# Patient Record
Sex: FEMALE | Race: WHITE | Employment: FULL TIME | URBAN - METROPOLITAN AREA
[De-identification: names, ages, dates, MRNs, and addresses within clinical notes are randomized per-mention and may not be internally consistent; named-entity substitution may affect disease eponyms.]

---

## 2020-07-23 DIAGNOSIS — Z01.818 PREOP EXAMINATION: ICD-10-CM

## 2020-07-23 PROCEDURE — U0003 INFECTIOUS AGENT DETECTION BY NUCLEIC ACID (DNA OR RNA); SEVERE ACUTE RESPIRATORY SYNDROME CORONAVIRUS 2 (SARS-COV-2) (CORONAVIRUS DISEASE [COVID-19]), AMPLIFIED PROBE TECHNIQUE, MAKING USE OF HIGH THROUGHPUT TECHNOLOGIES AS DESCRIBED BY CMS-2020-01-R: HCPCS

## 2020-07-23 RX ORDER — AMLODIPINE BESYLATE 5 MG/1
5 TABLET ORAL DAILY
COMMUNITY

## 2020-07-23 RX ORDER — MULTIVIT WITH MINERALS/LUTEIN
250 TABLET ORAL DAILY
COMMUNITY

## 2020-07-23 RX ORDER — INSULIN GLARGINE 100 [IU]/ML
18 INJECTION, SOLUTION SUBCUTANEOUS
COMMUNITY

## 2020-07-23 RX ORDER — BENAZEPRIL HYDROCHLORIDE 10 MG/1
10 TABLET ORAL DAILY
COMMUNITY
End: 2020-07-23

## 2020-07-23 RX ORDER — CELECOXIB 100 MG/1
100 CAPSULE ORAL DAILY
COMMUNITY

## 2020-07-23 RX ORDER — LOSARTAN POTASSIUM 25 MG/1
25 TABLET ORAL DAILY
COMMUNITY

## 2020-07-23 RX ORDER — MULTIVITAMIN
1 TABLET ORAL DAILY
COMMUNITY

## 2020-07-23 NOTE — PRE-PROCEDURE INSTRUCTIONS
Pre-Surgery Instructions:   Medication Instructions    amLODIPine (NORVASC) 5 mg tablet Instructed patient per Anesthesia Guidelines   APPLE CIDER VINEGAR PO Instructed patient per Anesthesia Guidelines   ascorbic acid (VITAMIN C) 250 mg tablet Instructed patient per Anesthesia Guidelines   ASPIRIN 81 PO Instructed patient per Anesthesia Guidelines   celecoxib (CeleBREX) 100 mg capsule Instructed patient per Anesthesia Guidelines   insulin glargine (LANTUS) 100 units/mL subcutaneous injection Instructed patient per Anesthesia Guidelines   losartan (COZAAR) 25 mg tablet Instructed patient per Anesthesia Guidelines   metFORMIN (GLUCOPHAGE) 500 mg tablet Instructed patient per Anesthesia Guidelines   Multiple Vitamin (MULTIVITAMIN) tablet Instructed patient per Anesthesia Guidelines   sitaGLIPtin (JANUVIA) 50 mg tablet Instructed patient per Anesthesia Guidelines  Instructed to take Amlodipine the morning of surgery with a sip of water  Instructed to take half of her usual Glargine insulin the evening before surgery ( take 9 units)  No aspirin, NSAIDs, vitamins, or supplements 1 week before surgery

## 2020-07-25 LAB — SARS-COV-2 RNA SPEC QL NAA+PROBE: NOT DETECTED

## 2020-07-29 ENCOUNTER — ANESTHESIA EVENT (OUTPATIENT)
Dept: PERIOP | Facility: HOSPITAL | Age: 60
End: 2020-07-29
Payer: COMMERCIAL

## 2020-07-30 ENCOUNTER — HOSPITAL ENCOUNTER (OUTPATIENT)
Facility: HOSPITAL | Age: 60
Setting detail: OUTPATIENT SURGERY
Discharge: HOME | End: 2020-07-31
Attending: PODIATRIST | Admitting: PODIATRIST
Payer: COMMERCIAL

## 2020-07-30 ENCOUNTER — HOSPITAL ENCOUNTER (OUTPATIENT)
Dept: RADIOLOGY | Facility: HOSPITAL | Age: 60
Setting detail: OUTPATIENT SURGERY
Discharge: HOME | End: 2020-07-30
Payer: COMMERCIAL

## 2020-07-30 ENCOUNTER — ANESTHESIA (OUTPATIENT)
Dept: PERIOP | Facility: HOSPITAL | Age: 60
End: 2020-07-30
Payer: COMMERCIAL

## 2020-07-30 ENCOUNTER — APPOINTMENT (OUTPATIENT)
Dept: RADIOLOGY | Facility: HOSPITAL | Age: 60
End: 2020-07-30
Payer: COMMERCIAL

## 2020-07-30 DIAGNOSIS — M14.672 CHARCOT ANKLE, LEFT: ICD-10-CM

## 2020-07-30 DIAGNOSIS — Z98.890 STATUS POST SURGERY: Primary | ICD-10-CM

## 2020-07-30 LAB
GLUCOSE SERPL-MCNC: 120 MG/DL (ref 65–140)
GLUCOSE SERPL-MCNC: 131 MG/DL (ref 65–140)
GLUCOSE SERPL-MCNC: 90 MG/DL (ref 65–140)
PLATELET # BLD AUTO: 215 THOUSANDS/UL (ref 149–390)
PMV BLD AUTO: 9.9 FL (ref 8.9–12.7)

## 2020-07-30 PROCEDURE — 82948 REAGENT STRIP/BLOOD GLUCOSE: CPT

## 2020-07-30 PROCEDURE — C1762 CONN TISS, HUMAN(INC FASCIA): HCPCS | Performed by: PODIATRIST

## 2020-07-30 PROCEDURE — C1713 ANCHOR/SCREW BN/BN,TIS/BN: HCPCS | Performed by: PODIATRIST

## 2020-07-30 PROCEDURE — 73610 X-RAY EXAM OF ANKLE: CPT

## 2020-07-30 PROCEDURE — 73630 X-RAY EXAM OF FOOT: CPT

## 2020-07-30 PROCEDURE — 85049 AUTOMATED PLATELET COUNT: CPT | Performed by: STUDENT IN AN ORGANIZED HEALTH CARE EDUCATION/TRAINING PROGRAM

## 2020-07-30 PROCEDURE — 73600 X-RAY EXAM OF ANKLE: CPT

## 2020-07-30 DEVICE — JOINT UNIVERSAL HINGED
Type: IMPLANTABLE DEVICE | Site: FOOT | Status: NON-FUNCTIONAL
Removed: 2020-09-23

## 2020-07-30 DEVICE — IMPLANTABLE DEVICE
Type: IMPLANTABLE DEVICE | Site: FOOT | Status: NON-FUNCTIONAL
Removed: 2020-09-23

## 2020-07-30 DEVICE — WIRE OLIVE 2MM
Type: IMPLANTABLE DEVICE | Site: FOOT | Status: NON-FUNCTIONAL
Removed: 2020-09-23

## 2020-07-30 DEVICE — BOLT WITH HEX 16MM
Type: IMPLANTABLE DEVICE | Site: FOOT | Status: NON-FUNCTIONAL
Removed: 2020-09-23

## 2020-07-30 DEVICE — POST 1 HOLE MALE
Type: IMPLANTABLE DEVICE | Site: FOOT | Status: NON-FUNCTIONAL
Removed: 2020-09-23

## 2020-07-30 DEVICE — POST 2 HOLE MALE
Type: IMPLANTABLE DEVICE | Site: FOOT | Status: NON-FUNCTIONAL
Removed: 2020-09-23

## 2020-07-30 DEVICE — (16 SQ CM) -ALLOGRAFT TISSUE WRAP DS WET 4 X 4 CM: Type: IMPLANTABLE DEVICE | Site: FOOT | Status: FUNCTIONAL

## 2020-07-30 DEVICE — RING OVAL 160MM FULL
Type: IMPLANTABLE DEVICE | Site: FOOT | Status: NON-FUNCTIONAL
Removed: 2020-09-23

## 2020-07-30 DEVICE — GRAFT PROLAYER 4 X 4CM 0.4-1MMTHCK: Type: IMPLANTABLE DEVICE | Site: FOOT | Status: FUNCTIONAL

## 2020-07-30 DEVICE — CLAMP ANGULAR CORRECTION
Type: IMPLANTABLE DEVICE | Site: FOOT | Status: NON-FUNCTIONAL
Removed: 2020-09-23

## 2020-07-30 DEVICE — BOLT W/ HEX 20MM
Type: IMPLANTABLE DEVICE | Site: FOOT | Status: NON-FUNCTIONAL
Removed: 2020-09-23

## 2020-07-30 DEVICE — BOLT UNIVERSALWIRE FIXATION
Type: IMPLANTABLE DEVICE | Site: FOOT | Status: NON-FUNCTIONAL
Removed: 2020-09-23

## 2020-07-30 DEVICE — POST 3 HOLE MALE
Type: IMPLANTABLE DEVICE | Site: FOOT | Status: NON-FUNCTIONAL
Removed: 2020-09-23

## 2020-07-30 DEVICE — WIRE BAYONET 2MM
Type: IMPLANTABLE DEVICE | Site: FOOT | Status: NON-FUNCTIONAL
Removed: 2020-09-23

## 2020-07-30 DEVICE — NUT 10MM
Type: IMPLANTABLE DEVICE | Site: FOOT | Status: NON-FUNCTIONAL
Removed: 2020-09-23

## 2020-07-30 RX ORDER — HYDROMORPHONE HCL/PF 1 MG/ML
SYRINGE (ML) INJECTION AS NEEDED
Status: DISCONTINUED | OUTPATIENT
Start: 2020-07-30 | End: 2020-07-30 | Stop reason: SURG

## 2020-07-30 RX ORDER — SODIUM CHLORIDE 9 MG/ML
125 INJECTION, SOLUTION INTRAVENOUS CONTINUOUS
Status: DISCONTINUED | OUTPATIENT
Start: 2020-07-30 | End: 2020-07-31 | Stop reason: HOSPADM

## 2020-07-30 RX ORDER — ACETAMINOPHEN 325 MG/1
650 TABLET ORAL EVERY 6 HOURS PRN
Status: DISCONTINUED | OUTPATIENT
Start: 2020-07-30 | End: 2020-07-30

## 2020-07-30 RX ORDER — ASPIRIN 81 MG/1
81 TABLET, CHEWABLE ORAL DAILY
Status: DISCONTINUED | OUTPATIENT
Start: 2020-07-30 | End: 2020-07-31 | Stop reason: HOSPADM

## 2020-07-30 RX ORDER — AMLODIPINE BESYLATE 5 MG/1
5 TABLET ORAL DAILY
Status: DISCONTINUED | OUTPATIENT
Start: 2020-07-30 | End: 2020-07-31 | Stop reason: HOSPADM

## 2020-07-30 RX ORDER — LOSARTAN POTASSIUM 25 MG/1
25 TABLET ORAL DAILY
Status: DISCONTINUED | OUTPATIENT
Start: 2020-07-30 | End: 2020-07-31 | Stop reason: HOSPADM

## 2020-07-30 RX ORDER — MAGNESIUM HYDROXIDE 1200 MG/15ML
LIQUID ORAL AS NEEDED
Status: DISCONTINUED | OUTPATIENT
Start: 2020-07-30 | End: 2020-07-30 | Stop reason: HOSPADM

## 2020-07-30 RX ORDER — OXYCODONE HYDROCHLORIDE AND ACETAMINOPHEN 5; 325 MG/1; MG/1
1 TABLET ORAL EVERY 4 HOURS PRN
Status: DISCONTINUED | OUTPATIENT
Start: 2020-07-30 | End: 2020-07-30

## 2020-07-30 RX ORDER — MIDAZOLAM HYDROCHLORIDE 2 MG/2ML
INJECTION, SOLUTION INTRAMUSCULAR; INTRAVENOUS AS NEEDED
Status: DISCONTINUED | OUTPATIENT
Start: 2020-07-30 | End: 2020-07-30 | Stop reason: SURG

## 2020-07-30 RX ORDER — OXYCODONE HYDROCHLORIDE 5 MG/1
5 TABLET ORAL EVERY 4 HOURS PRN
Status: DISCONTINUED | OUTPATIENT
Start: 2020-07-30 | End: 2020-07-31 | Stop reason: HOSPADM

## 2020-07-30 RX ORDER — FENTANYL CITRATE/PF 50 MCG/ML
25 SYRINGE (ML) INJECTION
Status: DISCONTINUED | OUTPATIENT
Start: 2020-07-30 | End: 2020-07-30 | Stop reason: HOSPADM

## 2020-07-30 RX ORDER — MULTIVIT WITH MINERALS/LUTEIN
250 TABLET ORAL DAILY
Status: DISCONTINUED | OUTPATIENT
Start: 2020-07-30 | End: 2020-07-31 | Stop reason: HOSPADM

## 2020-07-30 RX ORDER — OXYCODONE HYDROCHLORIDE 5 MG/1
10 TABLET ORAL EVERY 4 HOURS PRN
Status: DISCONTINUED | OUTPATIENT
Start: 2020-07-30 | End: 2020-07-31 | Stop reason: HOSPADM

## 2020-07-30 RX ORDER — ALBUTEROL SULFATE 2.5 MG/3ML
2.5 SOLUTION RESPIRATORY (INHALATION) ONCE AS NEEDED
Status: DISCONTINUED | OUTPATIENT
Start: 2020-07-30 | End: 2020-07-30 | Stop reason: HOSPADM

## 2020-07-30 RX ORDER — ACETAMINOPHEN 325 MG/1
975 TABLET ORAL 3 TIMES DAILY
Status: DISCONTINUED | OUTPATIENT
Start: 2020-07-30 | End: 2020-07-31 | Stop reason: HOSPADM

## 2020-07-30 RX ORDER — EPHEDRINE SULFATE 50 MG/ML
INJECTION INTRAVENOUS AS NEEDED
Status: DISCONTINUED | OUTPATIENT
Start: 2020-07-30 | End: 2020-07-30 | Stop reason: SURG

## 2020-07-30 RX ORDER — FENTANYL CITRATE 50 UG/ML
INJECTION, SOLUTION INTRAMUSCULAR; INTRAVENOUS AS NEEDED
Status: DISCONTINUED | OUTPATIENT
Start: 2020-07-30 | End: 2020-07-30 | Stop reason: SURG

## 2020-07-30 RX ORDER — CELECOXIB 100 MG/1
100 CAPSULE ORAL DAILY
Status: DISCONTINUED | OUTPATIENT
Start: 2020-07-30 | End: 2020-07-31 | Stop reason: HOSPADM

## 2020-07-30 RX ORDER — PROPOFOL 10 MG/ML
INJECTION, EMULSION INTRAVENOUS AS NEEDED
Status: DISCONTINUED | OUTPATIENT
Start: 2020-07-30 | End: 2020-07-30 | Stop reason: SURG

## 2020-07-30 RX ORDER — CEFAZOLIN SODIUM 2 G/50ML
2000 SOLUTION INTRAVENOUS ONCE
Status: COMPLETED | OUTPATIENT
Start: 2020-07-30 | End: 2020-07-30

## 2020-07-30 RX ORDER — INSULIN GLARGINE 100 [IU]/ML
18 INJECTION, SOLUTION SUBCUTANEOUS
Status: DISCONTINUED | OUTPATIENT
Start: 2020-07-30 | End: 2020-07-31 | Stop reason: HOSPADM

## 2020-07-30 RX ORDER — ONDANSETRON 2 MG/ML
INJECTION INTRAMUSCULAR; INTRAVENOUS AS NEEDED
Status: DISCONTINUED | OUTPATIENT
Start: 2020-07-30 | End: 2020-07-30 | Stop reason: SURG

## 2020-07-30 RX ADMIN — EPHEDRINE SULFATE 7.5 MG: 50 INJECTION, SOLUTION INTRAVENOUS at 12:42

## 2020-07-30 RX ADMIN — CELECOXIB 100 MG: 100 CAPSULE ORAL at 16:42

## 2020-07-30 RX ADMIN — MIDAZOLAM 2 MG: 1 INJECTION INTRAMUSCULAR; INTRAVENOUS at 12:06

## 2020-07-30 RX ADMIN — ONDANSETRON 4 MG: 2 INJECTION INTRAMUSCULAR; INTRAVENOUS at 14:41

## 2020-07-30 RX ADMIN — INSULIN GLARGINE 18 UNITS: 100 INJECTION, SOLUTION SUBCUTANEOUS at 22:28

## 2020-07-30 RX ADMIN — SODIUM CHLORIDE: 0.9 INJECTION, SOLUTION INTRAVENOUS at 14:11

## 2020-07-30 RX ADMIN — SODIUM CHLORIDE 125 ML/HR: 0.9 INJECTION, SOLUTION INTRAVENOUS at 20:43

## 2020-07-30 RX ADMIN — CEFAZOLIN SODIUM 2000 MG: 2 SOLUTION INTRAVENOUS at 11:50

## 2020-07-30 RX ADMIN — FENTANYL CITRATE 25 MCG: 50 INJECTION, SOLUTION INTRAMUSCULAR; INTRAVENOUS at 12:21

## 2020-07-30 RX ADMIN — ONDANSETRON 4 MG: 2 INJECTION INTRAMUSCULAR; INTRAVENOUS at 12:22

## 2020-07-30 RX ADMIN — FENTANYL CITRATE 25 MCG: 50 INJECTION, SOLUTION INTRAMUSCULAR; INTRAVENOUS at 13:07

## 2020-07-30 RX ADMIN — PROPOFOL 200 MG: 10 INJECTION, EMULSION INTRAVENOUS at 12:14

## 2020-07-30 RX ADMIN — ACETAMINOPHEN 975 MG: 325 TABLET ORAL at 20:45

## 2020-07-30 RX ADMIN — FENTANYL CITRATE 25 MCG: 50 INJECTION, SOLUTION INTRAMUSCULAR; INTRAVENOUS at 12:14

## 2020-07-30 RX ADMIN — ASPIRIN 81 MG 81 MG: 81 TABLET ORAL at 16:14

## 2020-07-30 RX ADMIN — SODIUM CHLORIDE 125 ML/HR: 0.9 INJECTION, SOLUTION INTRAVENOUS at 16:09

## 2020-07-30 RX ADMIN — ENOXAPARIN SODIUM 40 MG: 40 INJECTION SUBCUTANEOUS at 17:39

## 2020-07-30 RX ADMIN — FENTANYL CITRATE 25 MCG: 50 INJECTION, SOLUTION INTRAMUSCULAR; INTRAVENOUS at 13:11

## 2020-07-30 RX ADMIN — LOSARTAN POTASSIUM 25 MG: 25 TABLET, FILM COATED ORAL at 16:14

## 2020-07-30 RX ADMIN — Medication 250 MG: at 16:43

## 2020-07-30 RX ADMIN — HYDROMORPHONE HYDROCHLORIDE 0.5 MG: 1 INJECTION, SOLUTION INTRAMUSCULAR; INTRAVENOUS; SUBCUTANEOUS at 14:40

## 2020-07-30 RX ADMIN — SODIUM CHLORIDE 125 ML/HR: 0.9 INJECTION, SOLUTION INTRAVENOUS at 11:20

## 2020-07-30 RX ADMIN — LIDOCAINE HYDROCHLORIDE 60 MG: 20 INJECTION, SOLUTION INTRAVENOUS at 12:14

## 2020-07-30 RX ADMIN — ACETAMINOPHEN 975 MG: 325 TABLET ORAL at 16:14

## 2020-07-30 NOTE — OP NOTE
OPERATIVE REPORT - Podiatry  PATIENT NAME: Kami Hager    :  1960  MRN: [id number]  Pt Location: [facility] OR ROOM 02    SURGERY DATE: 2020    Surgeon(s) and Role: * Zahida Mccarty DPM - Primary     * Mandy Rivas DPM - Assisting    Pre-op Diagnosis:  Non-pressure chronic ulcer of left heel and midfoot with fat layer exposed ([address] ) [L97 422]  Non-pressure chronic ulcer of other part of right foot with unspecified severity ([address] ) [L97 519]  Charcot ankle, left [M14 672]    Post-Op Diagnosis Codes:     * Non-pressure chronic ulcer of left heel and midfoot with fat layer exposed ([address] ) [L97 422]     * Non-pressure chronic ulcer of other part of right foot with unspecified severity ([address] ) [L97 519]     * Charcot ankle, left [M14 672]    Procedure(s) (LRB):  ENDO GASTROC RECESSION (Left)  EX-FIX APPLICATION (Left)  DEBRIDE ULCER W/GRAFT APPS (Bilateral)  BONE STIM APPLICATION & activation (Left)    Specimen(s):  * No specimens in log *    Estimated Blood Loss:   Minimal    Drains:  * No LDAs found *    Anesthesia Type:   General     Hemostasis:  Pneumatic thigh tourniquet at 300 mmHg for 44 minutes    Materials:  Implant Name Type Inv   Item Serial No   Lot No  LRB No  Used Action   [id number]  (16 SQ CM) -ALLOGRAFT TISSUE WRAP DS WET 4 X 4 CM  [employer] [id number] Left 1 Implanted   RING OVAL 160MM FULL - [id number]  RING OVAL 160MM FULL  [employer]  Left 3 Implanted   RING 3/4 DOUBLE HOLE 160MM - [id number]  RING 3/4 DOUBLE HOLE 160MM  [employer]  Left 1 Implanted   CLAMP ANGULAR CORRECTION - [id number]  CLAMP ANGULAR CORRECTION  [employer]  Left 6 Implanted   JOINT UNIVERSAL HINGED - [id number]  JOINT UNIVERSAL HINGED  [employer]  Left 6 Implanted   NUT 10MM - [id number]  NUT 10MM  [employer]  Left 26 Implanted   STRUT RIGID COMPRESSION 100MM - [id number]  STRUT RIGID COMPRESSION 100MM  [employer]  Left 4 Implanted   BOLT WITH HEX 16MM - [id number]  BOLT WITH HEX 16MM  [employer]  Left 14 Implanted   BOLT WITH HEX 20MM - [id number]  BOLT WITH HEX 20MM  [employer]  Left 6 Implanted   RAIL BODY COMPLETE XLNG - [id number]  RAIL BODY COMPLETE XLNG  [employer]  Left 6 Implanted   WIRE BAYONET 2MM - [id number]  WIRE BAYONET 2MM  [employer]  Left 4 Implanted   WIRE OLIVE 2MM - [id number]  WIRE OLIVE 2MM  [employer]  Left 2 Implanted   BOLT UNIVERSALWIRE FIXATION - [id number]  BOLT UNIVERSALWIRE FIXATION  [employer]  Left 12 Implanted   BOLT UNIVERSAL HALF PIN FIXATION - [id number]  BOLT UNIVERSAL HALF PIN FIXATION  [employer]  Left 3 Implanted   PIN HALF 5 X 180MM W/40MM THRD - [id number]  PIN HALF 5 X 180MM W/40MM THRD  [employer]  Left 2 Implanted   PIN HALF 4 X 180MM W/40MMTHRD - [id number]  PIN HALF 4 X 180MM W/40MMTHRD  [employer]  Left 2 Implanted   POST 1 HOLE MALE - [id number]  POST [address]  [facility]  Left 2 Implanted   POST 3 HOLE MALE - [id number]  POST 3 HOLE MALE  [employer]  Left 2 Implanted   POST 2 HOLE MALE - [id number]  POST 2 HOLE MALE  [employer]  Left 1 Implanted   [id number]  GRAFT PROLAYER 4 X 4CM 0 4-1MMTHCK  [employer] [id number] Left 1 Implanted     4-0 nylon    Operative Findings:  Consistent with diagnosis    Complications:   None    Procedure and Technique:     Under mild sedation, the patient was brought into the operating room and placed on the operating room table in the supine position  IV sedation was achieved by anesthesia team and a universal timeout was performed where all parties are in agreement of correct patient, correct procedure and correct site  A pneumatic tourniquet was then placed over the patient's left lower extremity with ample padding  The foot was then prepped and draped in the usual aseptic manner  An esmarch bandage was used to exsangunate the foot and the pneumatic tourniquet was then inflated to 300mmHg  Attention was directed to the medial aspect of distal 1/3 leg where a small 1cm stab linear incision was made using #15 blade   The incision was deepened down to subcutaneous tissue using hemostat  Next using a manufactures protocol an endoscopic gastroc recession was performed  Then the obturator and cannula as one full unit were inserted in the plane of tissue medially to laterally  At this location a 15 blade was used to make a stab incision for the lateral skin portal  The obturator cannula was passed through the lateral portal  The outrigger was removed  Several cotton swabs were passed through the cannula to remove any loose fatty debris  Then the camera was inserted into the lateral portal to observe the gastroc fascia  A blunt probe was used to help visualize the fascia  Then a triangular blade was used to release the fascia from a lateral to medial direction applying dorsiflexion force to the ankle  After doing so the underlying muscle belly was visualized  The camera was removed and the site was flushed with saline  The obturator was placed back into the cannula and it was removed in its one full unit  Incision sites were rinsed with copious amount of normal sterile saline  An allowrap was placed into the medial incision site  Medial and lateral incisions were reapproximated utilizing a 4 0 nylon  The pneumatic thigh tourniquet was then deflated and a prompt hyperemic response was noted to the all digits  Next attention was directed to the left leg and foot for application of SEAL external fixator for distraction of the foot  To begin with two smooth pins were placed into the proximal tibia in 2 smooth pins were placed into the distal tibia, a circular frame was then fixated for stabilization  Attention was then directed to the foot where 2 half pins were placed into calcaneus unicortical   Attention was then directed to the midfoot where 2 cross olive wire pins were placed one from 5th metatarsal base extending medially to the level of 1st metatarsal base and 2nd from 1st metatarsal base extending laterally to the 5th metatarsal base    At this point the remaining distal circular and 1/3 frames were secured to the half pins and olive wires  Distraction was achieved by tightening the struts medially and laterally  This was confirmed utilizing C-arm fluoroscopy  Next attention was directed to the left medial hallux wound  Using a #15 the wound was excisionally debrided to healthy bleeding subcutaneous tissue  Removed fibrotic nonviable tissue  Post debridement the wound measured 2 0x0 6x0 2cm  A dermal graft prolayer was applied to the wound and was secured using staples  Mineral oil was then applied on the graft this was covered with 4 x 4 gauze and Honorio  All the incision sites were then dressed with Xeroform, 4 x 4 gauze, Honorio and Ace wrap  Gonzales Hernandez was present for application of bone growth stimulator  Patient tolerated the procedure and anesthesia well and was transferred to PACU with stable vital signs  Dr Karl Avina was present during the entire procedure and participated in all key aspects  Lizzeth Burch DPM  DATE: July 30, 2020  TIME: 3:09 PM      Portions of the record may have been created with voice recognition software  Occasional wrong word or "sound a like" substitutions may have occurred due to the inherent limitations of voice recognition software  Read the chart carefully and recognize, using context, where substitutions have occurred

## 2020-07-30 NOTE — ANESTHESIA POSTPROCEDURE EVALUATION
Post-Op Assessment Note    CV Status:  Stable    Pain management: adequate     Mental Status:  Alert and awake   Hydration Status:  Euvolemic   PONV Controlled:  Controlled   Airway Patency:  Patent   Post Op Vitals Reviewed: Yes      Staff: Anesthesiologist           /69 (07/30/20 1512)    Temp      Pulse 84 (07/30/20 1512)   Resp 13 (07/30/20 1512)    SpO2 100 % (07/30/20 1512)

## 2020-07-30 NOTE — ANESTHESIA PREPROCEDURE EVALUATION
Review of Systems/Medical History  Patient summary reviewed  Chart reviewed  No history of anesthetic complications     Cardiovascular  Hypertension controlled,    Pulmonary  Negative pulmonary ROS        GI/Hepatic  Negative GI/hepatic ROS          Negative  ROS        Endo/Other  Diabetes well controlled type 2 Insulin,      GYN  Negative gynecology ROS          Hematology  Negative hematology ROS      Musculoskeletal  Negative musculoskeletal ROS   Comment: Charcot foot,fractures and ulcers      Neurology  Negative neurology ROS      Psychology   Negative psychology ROS              Physical Exam    Airway    Mallampati score: II  TM Distance: >3 FB  Neck ROM: full     Dental   No notable dental hx     Cardiovascular  Rhythm: regular, Rate: normal, Cardiovascular exam normal    Pulmonary  Pulmonary exam normal Breath sounds clear to auscultation,     Other Findings        Anesthesia Plan  ASA Score- 2     Anesthesia Type- general and regional with ASA Monitors  Additional Monitors:   Airway Plan:     Comment: Hx delayed emergence after 10 hour cancer surgery        Plan Factors-  Patient did not smoke on day of surgery  Induction- intravenous  Postoperative Plan-     Informed Consent- Anesthetic plan and risks discussed with patient and spouse

## 2020-07-30 NOTE — H&P
H&P Exam - Ewing Fret 64 y o  female MRN: [id number]    Unit/Bed#: OR [place] Encounter: [id number]    Assessment:  64 y/p diabetic female s/p endoscopic gastroc Recession, Application of Ex-fix, wound debridement with dermal graft application  1  Diabetes mellitus  2  Hypertension      Plan:  - patient will be admitted for 23 hour observation under Dr Beth Moseley service for postop pain control and physical therapy evaluation   -continue all home pharmacological agents, placed patient on insulin sliding scale  -patient started on Lovenox, she will be discharged with Lovenox for DVT prophylaxis  -nonweightbearing to the left lower extremity    Dispo:  attending pain control and PT evaluation    History of Present Illness   A 51-year-old female with past medical history of diabetes and hypertension now status post endoscopic gastroc recession, application of ex fix, wound debridement and dermal graft application secondary to Charcot and rocker bottom foot deformity  Patient is admitted for 23 hour observation  Patient denies any complaints  Review of Systems   Constitutional: Negative  HENT: Negative  Respiratory: Negative  Cardiovascular: Negative  Gastrointestinal: Negative  Musculoskeletal: Negative  Skin: Positive for wound  Neurological: Negative  Psychiatric/Behavioral: Negative          Historical Information   Past Medical History:   Diagnosis Date    Colon polyp     Diabetes mellitus [facility] )     Foot ulceration ([facility] )     bilat    Hypertension      Past Surgical History:   Procedure Laterality Date     SECTION      x 3    COLONOSCOPY      HYSTERECTOMY      JOINT REPLACEMENT Right      Social History   Social History     Substance and Sexual Activity   Alcohol Use Yes    Frequency: Monthly or less    Drinks per session: 1 or 2    Comment: rarely     Social History     Substance and Sexual Activity   Drug Use Never     Social History     Tobacco Use   Smoking Status Never Smoker   Smokeless Tobacco Never Used     E-Cigarette Use: Never User     E-Cigarette/Vaping Substances    Nicotine No     THC No     CBD No     Flavoring No     Other No     Unknown No        Family History: non-contributory    Meds/Allergies   all medications and allergies reviewed  No Known Allergies    Objective   First Vitals:   Blood Pressure: (!) 185/79 (07/30/20 1051)  Pulse: 93 (07/30/20 1051)  Temperature: 97 7 °F (36 5 °C) (07/30/20 1051)  Temp Source: Tympanic (07/30/20 1051)  Respirations: 16 (07/30/20 1051)  Height: 5' 2" (157 5 cm) (07/23/20 1034)  Weight - Scale: 69 9 kg (154 lb) (07/23/20 1034)  SpO2: 100 % (07/30/20 1051)    Current Vitals:   Blood Pressure: 149/69 (07/30/20 1512)  Pulse: 84 (07/30/20 1512)  Temperature: 97 9 °F (36 6 °C) (07/30/20 1457)  Temp Source: Tympanic (07/30/20 1051)  Respirations: 13 (07/30/20 1512)  Height: 5' 2" (157 5 cm) (07/30/20 1051)  Weight - Scale: 68 5 kg (151 lb) (07/30/20 1051)  SpO2: 100 % (07/30/20 1512)      Intake/Output Summary (Last 24 hours) at 7/30/2020 1529  Last data filed at 7/30/2020 1458  Gross per 24 hour   Intake 1300 ml   Output    Net 1300 ml       Invasive Devices     Peripheral Intravenous Line            Peripheral IV 07/30/20 Left Hand less than 1 day                Physical Exam   Constitutional: She is oriented to person, place, and time  She appears well-developed and well-nourished  Neck: Normal range of motion  Neck supple  Cardiovascular: Normal rate  Pulses:       Dorsalis pedis pulses are 1+ on the right side, and 1+ on the left side  Posterior tibial pulses are 1+ on the right side, and 1+ on the left side  Pulmonary/Chest: Effort normal    Abdominal: Soft  Musculoskeletal: Normal range of motion  She exhibits edema and deformity  Right foot: Normal         Left foot: There is tenderness  Feet:   Right Foot:   Protective Sensation: 10 sites tested  3 sites sensed     Skin Integrity: Positive for dry skin  Left Foot:   Protective Sensation: 10 sites tested  3 sites sensed  Skin Integrity: Positive for ulcer  Neurological: She is alert and oriented to person, place, and time  Skin: Skin is warm  Capillary refill takes less than 2 seconds  Left 1st hallux wound measuring 2 0x0 6x0 2cm with skin graft substitute applied          Lab Results:   Imaging:   EKG, Pathology, and Other Studies:     Code Status: No Order  Advance Directive and Living Will:      Power of :    POLST:

## 2020-07-30 NOTE — PLAN OF CARE
Problem: Potential for Falls  Goal: Patient will remain free of falls  Description  INTERVENTIONS:  - Assess patient frequently for physical needs  -  Identify cognitive and physical deficits and behaviors that affect risk of falls    -  [employer] fall precautions as indicated by assessment   - Educate patient/family on patient safety including physical limitations  - Instruct patient to call for assistance with activity based on assessment  - Modify environment to reduce risk of injury  - Consider OT/PT consult to assist with strengthening/mobility  Outcome: Progressing     Problem: PAIN - ADULT  Goal: Verbalizes/displays adequate comfort level or baseline comfort level  Description  Interventions:  - Encourage patient to monitor pain and request assistance  - Assess pain using appropriate pain scale  - Administer analgesics based on type and severity of pain and evaluate response  - Implement non-pharmacological measures as appropriate and evaluate response  - Consider cultural and social influences on pain and pain management  - Notify physician/advanced practitioner if interventions unsuccessful or patient reports new pain  Outcome: Progressing     Problem: INFECTION - ADULT  Goal: Absence or prevention of progression during hospitalization  Description  INTERVENTIONS:  - Assess and monitor for signs and symptoms of infection  - Monitor lab/diagnostic results  - Monitor all insertion sites, i e  indwelling lines, tubes, and drains  - Monitor endotracheal if appropriate and nasal secretions for changes in amount and color  - [employer] appropriate cooling/warming therapies per order  - Administer medications as ordered  - Instruct and encourage patient and family to use good hand hygiene technique  - Identify and instruct in appropriate isolation precautions for identified infection/condition  Outcome: Progressing     Problem: SAFETY ADULT  Goal: Maintain or return to baseline ADL function  Description  INTERVENTIONS:  -  Assess patient's ability to carry out ADLs; assess patient's baseline for ADL function and identify physical deficits which impact ability to perform ADLs (bathing, care of mouth/teeth, toileting, grooming, dressing, etc )  - Assess/evaluate cause of self-care deficits   - Assess range of motion  - Assess patient's mobility; develop plan if impaired  - Assess patient's need for assistive devices and provide as appropriate  - Encourage maximum independence but intervene and supervise when necessary  - Involve family in performance of ADLs  - Assess for home care needs following discharge   - Consider OT consult to assist with ADL evaluation and planning for discharge  - Provide patient education as appropriate  Outcome: Progressing  Goal: Maintain or return mobility status to optimal level  Description  INTERVENTIONS:  - Assess patient's baseline mobility status (ambulation, transfers, stairs, etc )    - Identify cognitive and physical deficits and behaviors that affect mobility  - Identify mobility aids required to assist with transfers and/or ambulation (gait belt, sit-to-stand, lift, walker, cane, etc )  - [place] fall precautions as indicated by assessment  - Record patient progress and toleration of activity level on Mobility SBAR; progress patient to next Phase/Stage  - Instruct patient to call for assistance with activity based on assessment  - Consider rehabilitation consult to assist with strengthening/weightbearing, etc   Outcome: Progressing     Problem: DISCHARGE PLANNING  Goal: Discharge to home or other facility with appropriate resources  Description  INTERVENTIONS:  - Identify barriers to discharge w/patient and caregiver  - Arrange for needed discharge resources and transportation as appropriate  - Identify discharge learning needs (meds, wound care, etc )  - Arrange for interpretive services to assist at discharge as needed  - Refer to Case Management Department for coordinating discharge planning if the patient needs post-hospital services based on physician/advanced practitioner order or complex needs related to functional status, cognitive ability, or social support system  Outcome: Progressing     Problem: Knowledge Deficit  Goal: Patient/family/caregiver demonstrates understanding of disease process, treatment plan, medications, and discharge instructions  Description  Complete learning assessment and assess knowledge base    Interventions:  - Provide teaching at level of understanding  - Provide teaching via preferred learning methods  Outcome: Progressing

## 2020-07-31 VITALS
OXYGEN SATURATION: 97 % | TEMPERATURE: 98.6 F | WEIGHT: 151 LBS | HEIGHT: 62 IN | DIASTOLIC BLOOD PRESSURE: 86 MMHG | HEART RATE: 82 BPM | BODY MASS INDEX: 27.79 KG/M2 | RESPIRATION RATE: 18 BRPM | SYSTOLIC BLOOD PRESSURE: 189 MMHG

## 2020-07-31 LAB
GLUCOSE SERPL-MCNC: 112 MG/DL (ref 65–140)
GLUCOSE SERPL-MCNC: 159 MG/DL (ref 65–140)
GLUCOSE SERPL-MCNC: 93 MG/DL (ref 65–140)

## 2020-07-31 PROCEDURE — 82948 REAGENT STRIP/BLOOD GLUCOSE: CPT

## 2020-07-31 PROCEDURE — 97163 PT EVAL HIGH COMPLEX 45 MIN: CPT

## 2020-07-31 PROCEDURE — 97530 THERAPEUTIC ACTIVITIES: CPT

## 2020-07-31 RX ORDER — OXYCODONE HYDROCHLORIDE AND ACETAMINOPHEN 5; 325 MG/1; MG/1
1 TABLET ORAL EVERY 4 HOURS PRN
Qty: 10 TABLET | Refills: 0 | Status: SHIPPED | OUTPATIENT
Start: 2020-07-31 | End: 2020-08-10

## 2020-07-31 RX ADMIN — LOSARTAN POTASSIUM 25 MG: 25 TABLET, FILM COATED ORAL at 08:34

## 2020-07-31 RX ADMIN — SODIUM CHLORIDE 125 ML/HR: 0.9 INJECTION, SOLUTION INTRAVENOUS at 04:24

## 2020-07-31 RX ADMIN — ACETAMINOPHEN 975 MG: 325 TABLET ORAL at 08:34

## 2020-07-31 RX ADMIN — INSULIN LISPRO 1 UNITS: 100 INJECTION, SOLUTION INTRAVENOUS; SUBCUTANEOUS at 11:50

## 2020-07-31 RX ADMIN — CELECOXIB 100 MG: 100 CAPSULE ORAL at 08:35

## 2020-07-31 RX ADMIN — Medication 250 MG: at 08:35

## 2020-07-31 RX ADMIN — ASPIRIN 81 MG 81 MG: 81 TABLET ORAL at 08:34

## 2020-07-31 RX ADMIN — ENOXAPARIN SODIUM 40 MG: 40 INJECTION SUBCUTANEOUS at 08:34

## 2020-07-31 RX ADMIN — AMLODIPINE BESYLATE 5 MG: 5 TABLET ORAL at 08:34

## 2020-07-31 NOTE — PLAN OF CARE
Problem: PHYSICAL THERAPY ADULT  Goal: Performs mobility at highest level of function for planned discharge setting  See evaluation for individualized goals  Description  Treatment/Interventions: Functional transfer training, LE strengthening/ROM, Elevations, Therapeutic exercise, Endurance training, Patient/family training, Equipment eval/education, Bed mobility, Gait training, Compensatory technique education, Continued evaluation, Spoke to nursing, Spoke to advanced practitioner, Spoke to case management, Family  Equipment Recommended: Other (Comment)(BSC)       See flowsheet documentation for full assessment, interventions and recommendations  Note:   Prognosis: Fair  Problem List: Decreased strength, Decreased endurance, Impaired balance, Decreased mobility, Impaired judgement, Obesity, Decreased skin integrity, Pain, Orthopedic restrictions  Assessment: Kami Hager is a 64 y o  female admitted to [facility] on 7/30/2020 for <principal problem not specified> s/p ENDO GASTROC RECESSION (Left) EX-FIX APPLICATION (Left), DEBRIDE ULCER W/GRAFT APPS (Bilateral), BONE STIM APPLICATION & activation (Left)  Pt  has a past medical history of Colon polyp, Diabetes mellitus ([facility] ), Foot ulceration ([facility] ), and Hypertension    PT was consulted and pt was seen on 7/31/2020 for mobility assessment and d/c planning  Pt presents NWB LLE w ex fix, high fall risk, PIV  Pt verbalize understanding of NWB, occ vc for safety during functional tasks to reinforce  Pt is currently functioning at a modified independent assistance level for bed mobility, supervision assistance x1 level for transfers, supervision assistance x1 level for ambulation with knee walker  Additional session spent stair training to determine appropriateness of second floor set up at home  Minimum assistance x1 for elevations via bumping up/down   Pt w increased difficulty transf from standing<>seated position due to generalized weakness, difficulty maintain NWB and poor balance/stability  Discussed multiple options for alternate methods of stair negotiation including crutches, use of chair to ease transitions, hop to method  Pt decline crutches due to fear of LOB  Also declining hop to method  Prefer to bump up/down at this time  Understands difficulties with sitting/standing transition impacting safety w steps at home  Agreeable to first floor set up at d/c  Declining HHPT at this time to further work on stair training at home, pt prefers to hold off until OP podiatry f/u appt  Pt will benefit from continued skilled IP PT to address the above mentioned impairments  in order to maximize recovery and increase functional independence when completing mobility and ADLs  Currently PT recommendations for DME include BSC  At this time PT recommendations for d/c are home w family support and first floor set up  Refusing STR, HHPT  CM and Podiatry updated  Barriers to Discharge: Inaccessible home environment  Barriers to Discharge Comments: would require first floor set up  PT Discharge Recommendation: [address] [place], Return to previous environment with social support, Home with skilled therapy(pt refusing STR, declines need for HHPT)     PT - OK to Discharge: Yes    See flowsheet documentation for full assessment

## 2020-07-31 NOTE — PHYSICAL THERAPY NOTE
PHYSICAL THERAPY EVALUATION          Patient Name: Joanie LYNCH Date: 2020   PT EVALUATION [id number]  Therapeutic Activity 2035-1246    64 y o     [id number]    Non-pressure chronic ulcer of left heel and midfoot with fat layer exposed ([facility] ) [L97 422]  Non-pressure chronic ulcer of other part of right foot with unspecified severity ([facility] ) [L97 519]  Charcot ankle, left [M14 672]    Past Medical History:   Diagnosis Date    Colon polyp     Diabetes mellitus ([facility] )     Foot ulceration ([facility] )     bilat    Hypertension      Past Surgical History:   Procedure Laterality Date     SECTION      x 3    COLONOSCOPY      HYSTERECTOMY      JOINT REPLACEMENT Right         20 1404   Note Type   Note type Eval/Treat   Pain Assessment   Pain Assessment Tool 0-10   Pain Score 4   Pain Location/Orientation Orientation: Left; Location: Foot   Hospital Pain Intervention(s) Repositioned; Ambulation/increased activity; Elevated; Emotional support   Home Living   Type of [address] Two level;Bed/bath upstairs;Stairs to enter without rails   [address] Walker;Cane;Other (Comment)  (Rollabout)   Additional Comments 1 TORI  no bathroom on main level, FOS to second story w LHR going 3/4 of the way  hoping to stay on second level but does report first floor set up can be possible if able to obtain [facility]   Prior Function   Level of [place] Independent with ADLs and functional mobility   Lives With Spouse   Receives Help From Family   ADL Assistance Independent   IADLs Independent   Falls in the last 6 months 0   Comments pta pt reports being indep and amb w/o an AD  will be alone at times upon d/c  Restrictions/Precautions   Weight Bearing Precautions Per Order Yes   LLE Weight Bearing Per Order NWB  (ex fix)   Other Precautions WBS; Fall Risk;Pain   General   Additional Pertinent History s/p ENDO GASTROC RECESSION (Left) EX-FIX APPLICATION (Left), DEBRIDE ULCER W/GRAFT APPS (Bilateral), BONE STIM APPLICATION & activation (Left) on 7/30/20   Family/Caregiver Present Yes  ()   Cognition   Overall Cognitive Status WFL   Arousal/Participation Cooperative   Orientation Level Oriented X4   Memory Within functional limits   Following Commands Follows all commands and directions without difficulty   RUE Assessment   RUE Assessment WFL  (grossly 4/5)   LUE Assessment   LUE Assessment WFL  (grossly 4/5)   RLE Assessment   RLE Assessment WFL  (grossly 4/5)   LLE Assessment   LLE Assessment X   Strength LLE   L Hip Flexion 3-/5   L Ankle Dorsiflexion   (not tested; ex fix)   L Ankle Plantar Flexion   (not tested; ex fix)   L Knee Extension 3/5   Coordination   Movements are Fluid and Coordinated 1   Sensation WFL   Bed Mobility   Supine to Sit 6  Modified independent   Additional items HOB elevated; Bedrails; Increased time required   Sit to Supine 6  Modified independent   Additional items HOB elevated; Bedrails; Increased time required   Transfers   Sit to Stand 5  Supervision   Additional items Bedrails; Increased time required;Verbal cues   Stand to Sit 5  Supervision   Additional items Bedrails; Increased time required;Verbal cues; Other  (AD)   Stand pivot 5  Supervision   Additional items Increased time required;Verbal cues; Other  (RW)   Additional Comments close S for safety and vc to reinforce NWB LLE  use of bedrails or RW for support   Ambulation/Elevation   Gait pattern Narrow TANIA; Decreased foot clearance; Short stride; Excessively slow  (hop to)   Gait Assistance 5  Supervision   Additional items Assist x 1;Verbal cues   Assistive Device Rolling walker;Rollabout   Distance 100'x2   Balance   Static Standing Fair  (w RW)   Dynamic Standing Fair -   Ambulatory Fair -   Endurance Deficit   Endurance Deficit Yes   Endurance Deficit Description fatigue, weakness   Activity Tolerance Activity Tolerance Patient limited by fatigue;Treatment limited secondary to medical complications (Comment); Other (Comment)  (NWB LLE)   Medical Staff Made Aware Belle Ballard PCA present for stair transf; DPM and CM for BSC need, d/c recommendation   Nurse Made Aware Idalia RN   Assessment   Prognosis Fair   Problem List Decreased strength;Decreased endurance; Impaired balance;Decreased mobility; Impaired judgement;Obesity; Decreased skin integrity;Pain;Orthopedic restrictions   Assessment Jerrod Bright is a 64 y o  female admitted to [facility] on 7/30/2020 for <principal problem not specified> s/p ENDO GASTROC RECESSION (Left) EX-FIX APPLICATION (Left), DEBRIDE ULCER W/GRAFT APPS (Bilateral), BONE STIM APPLICATION & activation (Left)  Pt  has a past medical history of Colon polyp, Diabetes mellitus ([facility] ), Foot ulceration ([facility] ), and Hypertension    PT was consulted and pt was seen on 7/31/2020 for mobility assessment and d/c planning  Pt presents NWB LLE w ex fix, high fall risk, PIV  Pt verbalize understanding of NWB, occ vc for safety during functional tasks to reinforce  Pt is currently functioning at a modified independent assistance level for bed mobility, supervision assistance x1 level for transfers, supervision assistance x1 level for ambulation with knee walker  Additional session spent stair training to determine appropriateness of second floor set up at home  Minimum assistance x1 for elevations via bumping up/down  Pt w increased difficulty transf from standing<>seated position due to generalized weakness, difficulty maintain NWB and poor balance/stability  Discussed multiple options for alternate methods of stair negotiation including crutches, use of chair to ease transitions, hop to method  Pt decline crutches due to fear of LOB  Also declining hop to method  Prefer to bump up/down at this time  Understands difficulties with sitting/standing transition impacting safety w steps at home   Agreeable to first floor set up at d/c  Declining HHPT at this time to further work on stair training at home, pt prefers to hold off until OP podiatry f/u appt  Pt will benefit from continued skilled IP PT to address the above mentioned impairments  in order to maximize recovery and increase functional independence when completing mobility and ADLs  Currently PT recommendations for DME include BSC  At this time PT recommendations for d/c are home w family support and first floor set up  Refusing STR, HHPT  CM and Podiatry updated  Barriers to Discharge Inaccessible home environment   Barriers to Discharge Comments would require first floor set up   Goals   Patient Goals to go home, do better on steps   STG Expiration Date 08/14/20   Short Term Goal #1 1)  Pt will perform bed mobility with Neda demonstrating appropriate technique 100% of the time in order to improve function  2)  Perform all transfers with Neda demonstrating safe and appropriate technique 100% of the time in order to improve ability to negotiate safely in home environment  3) Amb with least restrictive AD > 150'x1 with mod I in order to demonstrate ability to negotiate in home environment  4)  Improve overall strength and balance 1/2 grade in order to optimize ability to perform functional tasks and reduce fall risk  5) Increase activity tolerance to 45 minutes in order to improve endurance to functional tasks  6)  Negotiate stairs using most appropriate technique and min A in order to be able to negotiate safely in home environment  7) PT for ongoing patient and family/caregiver education, DME needs and d/c planning in order to promote highest level of function in least restrictive environment  8) Will maintain WBS of LLE during all functional tasks  PT Treatment Day 1   Plan   Treatment/Interventions Functional transfer training;LE strengthening/ROM; Elevations; Therapeutic exercise; Endurance training;Patient/family training;Equipment eval/education; Bed mobility;Gait training; Compensatory technique education;Continued evaluation;Spoke to nursing;Spoke to advanced practitioner;Spoke to case management; Family   PT Frequency Other (Comment)  (4-6x)   Recommendation   PT Discharge Recommendation Post-Acute Rehabilitation Services; Return to previous environment with social support;Home with skilled therapy  (pt refusing STR, declines need for HHPT)   Equipment Recommended Other (Comment)  (BSC)   PT - OK to Discharge Yes   Additional Comments to STR or home w first floor set up   Modified Cochran Scale   Modified Cochran Scale 4   Barthel Index   Feeding 10   Bathing 0   Grooming Score 5   Dressing Score 5   Bladder Score 10   Bowels Score 10   Toilet Use Score 5   Transfers (Bed/Chair) Score 10   Mobility (Level Surface) Score 10  (w Rollabout)   Stairs Score 5   Barthel Index Score 70   History: co - morbidities, age, coping styles, social background (ma[place], alone at times), past experience (R TKA in Dec 2019), fall risk, use of assistive device, assist for adl's, cognition, multiple lines, NWB LLE  Exam: impairments in systems including musculoskeletal (strength), neuromuscular (balance,locomotion, gait, transfers, motor function and sensation), joint integrity (S/p L foot surgery), integumentary (skin integrity, presence of skin tear RLE), cognition  Clinical: unstable/unpredictable  Complexity:high      Godwin Keane, PT

## 2020-07-31 NOTE — PLAN OF CARE
Problem: Potential for Falls  Goal: Patient will remain free of falls  Description  INTERVENTIONS:  - Assess patient frequently for physical needs  -  Identify cognitive and physical deficits and behaviors that affect risk of falls    -  [place] fall precautions as indicated by assessment   - Educate patient/family on patient safety including physical limitations  - Instruct patient to call for assistance with activity based on assessment  - Modify environment to reduce risk of injury  - Consider OT/PT consult to assist with strengthening/mobility  Outcome: Progressing     Problem: PAIN - ADULT  Goal: Verbalizes/displays adequate comfort level or baseline comfort level  Description  Interventions:  - Encourage patient to monitor pain and request assistance  - Assess pain using appropriate pain scale  - Administer analgesics based on type and severity of pain and evaluate response  - Implement non-pharmacological measures as appropriate and evaluate response  - Consider cultural and social influences on pain and pain management  - Notify physician/advanced practitioner if interventions unsuccessful or patient reports new pain  Outcome: Progressing     Problem: INFECTION - ADULT  Goal: Absence or prevention of progression during hospitalization  Description  INTERVENTIONS:  - Assess and monitor for signs and symptoms of infection  - Monitor lab/diagnostic results  - Monitor all insertion sites, i e  indwelling lines, tubes, and drains  - Monitor endotracheal if appropriate and nasal secretions for changes in amount and color  - [place] appropriate cooling/warming therapies per order  - Administer medications as ordered  - Instruct and encourage patient and family to use good hand hygiene technique  - Identify and instruct in appropriate isolation precautions for identified infection/condition  Outcome: Progressing     Problem: SAFETY ADULT  Goal: Maintain or return to baseline ADL function  Description  INTERVENTIONS:  -  Assess patient's ability to carry out ADLs; assess patient's baseline for ADL function and identify physical deficits which impact ability to perform ADLs (bathing, care of mouth/teeth, toileting, grooming, dressing, etc )  - Assess/evaluate cause of self-care deficits   - Assess range of motion  - Assess patient's mobility; develop plan if impaired  - Assess patient's need for assistive devices and provide as appropriate  - Encourage maximum independence but intervene and supervise when necessary  - Involve family in performance of ADLs  - Assess for home care needs following discharge   - Consider OT consult to assist with ADL evaluation and planning for discharge  - Provide patient education as appropriate  Outcome: Progressing  Goal: Maintain or return mobility status to optimal level  Description  INTERVENTIONS:  - Assess patient's baseline mobility status (ambulation, transfers, stairs, etc )    - Identify cognitive and physical deficits and behaviors that affect mobility  - Identify mobility aids required to assist with transfers and/or ambulation (gait belt, sit-to-stand, lift, walker, cane, etc )  - [place] fall precautions as indicated by assessment  - Record patient progress and toleration of activity level on Mobility SBAR; progress patient to next Phase/Stage  - Instruct patient to call for assistance with activity based on assessment  - Consider rehabilitation consult to assist with strengthening/weightbearing, etc   Outcome: Progressing     Problem: DISCHARGE PLANNING  Goal: Discharge to home or other facility with appropriate resources  Description  INTERVENTIONS:  - Identify barriers to discharge w/patient and caregiver  - Arrange for needed discharge resources and transportation as appropriate  - Identify discharge learning needs (meds, wound care, etc )  - Arrange for interpretive services to assist at discharge as needed  - Refer to Case Management Department for coordinating discharge planning if the patient needs post-hospital services based on physician/advanced practitioner order or complex needs related to functional status, cognitive ability, or social support system  Outcome: Progressing     Problem: Knowledge Deficit  Goal: Patient/family/caregiver demonstrates understanding of disease process, treatment plan, medications, and discharge instructions  Description  Complete learning assessment and assess knowledge base    Interventions:  - Provide teaching at level of understanding  - Provide teaching via preferred learning methods  Outcome: Progressing     Problem: Prexisting or High Potential for Compromised Skin Integrity  Goal: Skin integrity is maintained or improved  Description  INTERVENTIONS:  - Identify patients at risk for skin breakdown  - Assess and monitor skin integrity  - Assess and monitor nutrition and hydration status  - Monitor labs   - Assess for incontinence   - Turn and reposition patient  - Assist with mobility/ambulation  - Relieve pressure over bony prominences  - Avoid friction and shearing  - Provide appropriate hygiene as needed including keeping skin clean and dry  - Evaluate need for skin moisturizer/barrier cream  - Collaborate with interdisciplinary team   - Patient/family teaching  - Consider wound care consult   Outcome: Progressing

## 2020-07-31 NOTE — PLAN OF CARE
Problem: Potential for Falls  Goal: Patient will remain free of falls  Description  INTERVENTIONS:  - Assess patient frequently for physical needs  -  Identify cognitive and physical deficits and behaviors that affect risk of falls    -  [place] fall precautions as indicated by assessment   - Educate patient/family on patient safety including physical limitations  - Instruct patient to call for assistance with activity based on assessment  - Modify environment to reduce risk of injury  - Consider OT/PT consult to assist with strengthening/mobility  7/31/2020 1514 by Naty Melo RN  Outcome: Completed  7/31/2020 0753 by Naty Melo RN  Outcome: Progressing     Problem: PAIN - ADULT  Goal: Verbalizes/displays adequate comfort level or baseline comfort level  Description  Interventions:  - Encourage patient to monitor pain and request assistance  - Assess pain using appropriate pain scale  - Administer analgesics based on type and severity of pain and evaluate response  - Implement non-pharmacological measures as appropriate and evaluate response  - Consider cultural and social influences on pain and pain management  - Notify physician/advanced practitioner if interventions unsuccessful or patient reports new pain  7/31/2020 1514 by Naty Melo RN  Outcome: Completed  7/31/2020 0753 by Naty Melo RN  Outcome: Progressing     Problem: INFECTION - ADULT  Goal: Absence or prevention of progression during hospitalization  Description  INTERVENTIONS:  - Assess and monitor for signs and symptoms of infection  - Monitor lab/diagnostic results  - Monitor all insertion sites, i e  indwelling lines, tubes, and drains  - Monitor endotracheal if appropriate and nasal secretions for changes in amount and color  - [place] appropriate cooling/warming therapies per order  - Administer medications as ordered  - Instruct and encourage patient and family to use good hand hygiene technique  - Identify and instruct in appropriate isolation precautions for identified infection/condition  7/31/2020 1514 by Hiral Shukla RN  Outcome: Completed  7/31/2020 0753 by Hiral Shukla RN  Outcome: Progressing     Problem: SAFETY ADULT  Goal: Maintain or return to baseline ADL function  Description  INTERVENTIONS:  -  Assess patient's ability to carry out ADLs; assess patient's baseline for ADL function and identify physical deficits which impact ability to perform ADLs (bathing, care of mouth/teeth, toileting, grooming, dressing, etc )  - Assess/evaluate cause of self-care deficits   - Assess range of motion  - Assess patient's mobility; develop plan if impaired  - Assess patient's need for assistive devices and provide as appropriate  - Encourage maximum independence but intervene and supervise when necessary  - Involve family in performance of ADLs  - Assess for home care needs following discharge   - Consider OT consult to assist with ADL evaluation and planning for discharge  - Provide patient education as appropriate  7/31/2020 1514 by Hiral Shukla RN  Outcome: Completed  7/31/2020 0753 by Hiral Shukla RN  Outcome: Progressing  Goal: Maintain or return mobility status to optimal level  Description  INTERVENTIONS:  - Assess patient's baseline mobility status (ambulation, transfers, stairs, etc )    - Identify cognitive and physical deficits and behaviors that affect mobility  - Identify mobility aids required to assist with transfers and/or ambulation (gait belt, sit-to-stand, lift, walker, cane, etc )  - [place] fall precautions as indicated by assessment  - Record patient progress and toleration of activity level on Mobility SBAR; progress patient to next Phase/Stage  - Instruct patient to call for assistance with activity based on assessment  - Consider rehabilitation consult to assist with strengthening/weightbearing, etc   7/31/2020 1514 by Hiral Shukla RN  Outcome: Completed  7/31/2020 0753 by Hiral Shukla RN  Outcome: Progressing     Problem: DISCHARGE PLANNING  Goal: Discharge to home or other facility with appropriate resources  Description  INTERVENTIONS:  - Identify barriers to discharge w/patient and caregiver  - Arrange for needed discharge resources and transportation as appropriate  - Identify discharge learning needs (meds, wound care, etc )  - Arrange for interpretive services to assist at discharge as needed  - Refer to Case Management Department for coordinating discharge planning if the patient needs post-hospital services based on physician/advanced practitioner order or complex needs related to functional status, cognitive ability, or social support system  7/31/2020 1514 by Uriel Cross RN  Outcome: Completed  7/31/2020 0753 by Uriel Cross RN  Outcome: Progressing     Problem: Knowledge Deficit  Goal: Patient/family/caregiver demonstrates understanding of disease process, treatment plan, medications, and discharge instructions  Description  Complete learning assessment and assess knowledge base    Interventions:  - Provide teaching at level of understanding  - Provide teaching via preferred learning methods  7/31/2020 1514 by Uriel Cross RN  Outcome: Completed  7/31/2020 0753 by Uriel Cross RN  Outcome: Progressing     Problem: Prexisting or High Potential for Compromised Skin Integrity  Goal: Skin integrity is maintained or improved  Description  INTERVENTIONS:  - Identify patients at risk for skin breakdown  - Assess and monitor skin integrity  - Assess and monitor nutrition and hydration status  - Monitor labs   - Assess for incontinence   - Turn and reposition patient  - Assist with mobility/ambulation  - Relieve pressure over bony prominences  - Avoid friction and shearing  - Provide appropriate hygiene as needed including keeping skin clean and dry  - Evaluate need for skin moisturizer/barrier cream  - Collaborate with interdisciplinary team   - Patient/family teaching  - Consider wound care consult 7/31/2020 1514 by Mabel Mena RN  Outcome: Completed  7/31/2020 0753 by Mabel Mena RN  Outcome: Progressing

## 2020-07-31 NOTE — SOCIAL WORK
JESSICA was notified via phone by Julito Cornejo that the pt required a prior authorization for Lovenox  Only a 5 day supply was approved  The pharmacy initiated the prior authorization and had the form faxed to JESSICA to complete it  JESSICA completed the online form via [employer]  After submission JESSICA was directed to call [facility] directly  It was determined that the pt did not require a prior authorization for the medication, but instead needed an exception that the associate was obtain  The pt was approved for a 30-day-supply of Lovenox with a $10 co-pay  JESSICA notified Julito Cornejo ([phone]) of the approval   JESSICA contacted the pt to notify of the co-pay  The pt stated that she would be able to pay the $10 co-pay  JESSICA submitted an order for a bedside Commode through [facility] via [facility]  The commode was delivered at bedside and the pt signed the delivery ticket  There are no additional needs at this time

## 2020-07-31 NOTE — PROGRESS NOTES
[facility] Podiatry - Progress Note  Patient: Janette Hogue 64 y o  female   MRN: [id number]  PCP: Dylan Hernadez DO  Unit/Bed#: E5 -01 Encounter: [id number]  Date Of Visit: 20    ASSESSMENT:    Janette Hogue is a 64 y o  female with:    1  Diabetes mellitus  2  Hypertension      PLAN:    · PT/OT orders pending  · Continue home medications  · Continue Lovenox on discharge  · NWB to LLE         SUBJECTIVE:     The patient was seen, evaluated, and assessed at bedside today  The patient was awake, alert, and in no acute distress  No acute events overnight  The patient reports no pain  She is eager to go home but wants to work with Physical therapy prior to discharge  Patient denies N/V/F/chills/SOB/CP  OBJECTIVE:     Vitals:   /79 (BP Location: Right arm)   Pulse 78   Temp 98 6 °F (37 °C) (Temporal)   Resp 18   Ht 5' 2" (1 575 m)   Wt 68 5 kg (151 lb)   SpO2 97%   BMI 27 62 kg/m²     Temp (24hrs), Av 9 °F (36 6 °C), Min:97 2 °F (36 2 °C), Max:98 6 °F (37 °C)      Physical Exam   Constitutional: She appears well-developed and well-nourished  No distress  HENT:   Head: Normocephalic  Eyes: Pupils are equal, round, and reactive to light  Neck: No JVD present  Pulmonary/Chest: Effort normal    Abdominal: Soft  Skin: Skin is warm  No rash noted  She is not diaphoretic  No erythema  Psychiatric: She has a normal mood and affect  Her behavior is normal  Judgment and thought content normal    Nursing note and vitals reviewed    :     General:  Alert, cooperative, and in no distress  Lower extremity exam:  Cardiovascular status at baseline  Neurological status at baseline  Musculoskeletal status at baseline  No calf tenderness noted bilaterally  Dressing left intact    Additional Data:     Labs:    Results from last 7 days   Lab Units 20  1629   PLATELETS Thousands/uL 215           Invalid input(s): LABALBU        * I Have Reviewed All Lab Data Listed Above      Recent Cultures (last 7 days):               Imaging: I have personally reviewed pertinent films in PACS  Pathology, and Other Studies: I have personally reviewed pertinent reports  ** Please Note: Portions of the record may have been created with voice recognition software  Occasional wrong word or "sound a like" substitutions may have occurred due to the inherent limitations of voice recognition software  Read the chart carefully and recognize, using context, where substitutions have occurred   **

## 2020-07-31 NOTE — DISCHARGE INSTRUCTIONS
Dr Alan Camara    1  Take your prescribed medication as directed  2  Upon arrival at home, lie down and elevate your surgical foot on 2 pillows  3  Stay off your feet as much as possible for the first 24-48 hours  This is when your feet first swell and may become painful  After 48 hours you may begin limited walking following these restrictions:   Nonweightbearing to surgical foot  4  Drink large quantities of water  Consume no alcohol  Continue a well-balanced diet  5  Report any unusual discomfort or fever to this office  6  A limited amount of discomfort and swelling is to be expected  In some cases the skin may take on a bruised appearance  The surgical cleansing solution that was applied to your foot prior to the operation is dark in color and the operation site may appear to be oozing when it actually is not  7  A slight amount of blood is to be expected, and is no cause for alarm  Do not remove the dressings  If there is active bleeding and if the bleeding persists, add additional gauze to the bandage, apply direct pressure, elevate your feet and call this office  8  Do not get the dressings wet  As regular bathing may be inconvenient, sponge baths are recommended  9  When anesthesia wears off and if any discomfort should be present, apply an ice pack directly over the operated area for 15 minute intervals for several hours or until the pain leaves  (USE IN EXCESS OF 15 MINUTES COULD CAUSE FROSTBITE)  Do not use hot water bags or electric pads  A convenient icepack can be made by placing ice cubes in a plastic bag and covering this with a towel  10  Take over-the-counter laxative for constipation, this is common with use of narcotic medications

## 2020-07-31 NOTE — DISCHARGE SUMMARY
Discharge Summary Outpatient Procedure Podiatry -   Severo Pock 64 y o  female MRN: [id number]  Unit/Bed#: E5 -01 Encounter: [id number]    Admission Date: 7/30/2020     Admitting Diagnosis: Non-pressure chronic ulcer of left heel and midfoot with fat layer exposed ([address] ) [L97 422]  Non-pressure chronic ulcer of other part of right foot with unspecified severity ([address] ) [L97 519]  Charcot ankle, left [M14 672]    Discharge Diagnosis: same    Procedures Performed: ENDO GASTROC RECESSION: 29998 (CPT®)  EX-FIX APPLICATION: 97877 (CPT®)  DEBRIDE ULCER W/GRAFT APPS:   BONE STIM APPLICATION & activation: 00324 (CPT®)    Complications: none    Condition at Discharge: stable    Discharge instructions/Information to patient and family:   See after visit summary for information provided to patient and family  Provisions for Follow-Up Care/Important appointments:  See after visit summary for information related to follow-up care and any pertinent home health orders  Discharge Medications:  See after visit summary for reconciled discharge medications provided to patient and family

## 2020-09-16 NOTE — PRE-PROCEDURE INSTRUCTIONS
Pre-Surgery Instructions:   Medication Instructions    amLODIPine (NORVASC) 5 mg tablet Instructed patient per Anesthesia Guidelines   APPLE CIDER VINEGAR PO Instructed patient per Anesthesia Guidelines   ascorbic acid (VITAMIN C) 250 mg tablet Instructed patient per Anesthesia Guidelines   ASPIRIN 81 PO Instructed patient per Anesthesia Guidelines   celecoxib (CeleBREX) 100 mg capsule Instructed patient per Anesthesia Guidelines   enoxaparin (LOVENOX) 40 mg/0 4 mL Instructed patient per Anesthesia Guidelines   insulin glargine (LANTUS) 100 units/mL subcutaneous injection Instructed patient per Anesthesia Guidelines   losartan (COZAAR) 25 mg tablet Instructed patient per Anesthesia Guidelines   metFORMIN (GLUCOPHAGE) 500 mg tablet Instructed patient per Anesthesia Guidelines   Multiple Vitamin (MULTIVITAMIN) tablet Instructed patient per Anesthesia Guidelines   sitaGLIPtin (JANUVIA) 50 mg tablet Instructed patient per Anesthesia Guidelines  Per physician's orders Lovenox is on hold- last dose was 9/12/20  Instructed to take Amlodipine with sip of water the morning of surgery  No NSAIDs 1 week before surgery  Take full dose of Lantus the evening before surgery  No oral diabetes medications the morning of surgery  Addendum:  Per medical clearance note instructions patient was called and instructed to take half of her usual Lantus dose and to hold her Metformin  the evening before surgery  Patient repeated back instructions

## 2020-09-21 ENCOUNTER — ANESTHESIA EVENT (OUTPATIENT)
Dept: PERIOP | Facility: HOSPITAL | Age: 60
End: 2020-09-21
Payer: COMMERCIAL

## 2020-09-23 ENCOUNTER — HOSPITAL ENCOUNTER (OUTPATIENT)
Dept: RADIOLOGY | Facility: HOSPITAL | Age: 60
Setting detail: OUTPATIENT SURGERY
Discharge: HOME | End: 2020-09-23
Payer: COMMERCIAL

## 2020-09-23 ENCOUNTER — ANESTHESIA (OUTPATIENT)
Dept: PERIOP | Facility: HOSPITAL | Age: 60
End: 2020-09-23
Payer: COMMERCIAL

## 2020-09-23 ENCOUNTER — HOSPITAL ENCOUNTER (OUTPATIENT)
Facility: HOSPITAL | Age: 60
Setting detail: OUTPATIENT SURGERY
Discharge: HOME | End: 2020-09-23
Attending: PODIATRIST | Admitting: PODIATRIST
Payer: COMMERCIAL

## 2020-09-23 VITALS
WEIGHT: 149 LBS | TEMPERATURE: 99 F | BODY MASS INDEX: 27.42 KG/M2 | DIASTOLIC BLOOD PRESSURE: 78 MMHG | HEIGHT: 62 IN | SYSTOLIC BLOOD PRESSURE: 148 MMHG | OXYGEN SATURATION: 98 % | HEART RATE: 92 BPM | RESPIRATION RATE: 16 BRPM

## 2020-09-23 VITALS — HEART RATE: 75 BPM

## 2020-09-23 DIAGNOSIS — M14.672 CHARCOT'S JOINT, LEFT ANKLE AND FOOT: ICD-10-CM

## 2020-09-23 DIAGNOSIS — L97.512 NON-PRESSURE CHRONIC ULCER OF OTHER PART OF RIGHT FOOT WITH FAT LAYER EXPOSED (HCC): ICD-10-CM

## 2020-09-23 DIAGNOSIS — Z98.890 POSTOPERATIVE STATE: Primary | ICD-10-CM

## 2020-09-23 LAB
GLUCOSE SERPL-MCNC: 113 MG/DL (ref 65–140)
GLUCOSE SERPL-MCNC: 113 MG/DL (ref 65–140)

## 2020-09-23 PROCEDURE — 82948 REAGENT STRIP/BLOOD GLUCOSE: CPT

## 2020-09-23 RX ORDER — OXYCODONE HYDROCHLORIDE AND ACETAMINOPHEN 5; 325 MG/1; MG/1
1 TABLET ORAL EVERY 4 HOURS PRN
Status: DISCONTINUED | OUTPATIENT
Start: 2020-09-23 | End: 2020-09-23 | Stop reason: HOSPADM

## 2020-09-23 RX ORDER — ONDANSETRON 2 MG/ML
INJECTION INTRAMUSCULAR; INTRAVENOUS AS NEEDED
Status: DISCONTINUED | OUTPATIENT
Start: 2020-09-23 | End: 2020-09-23

## 2020-09-23 RX ORDER — OXYCODONE HYDROCHLORIDE AND ACETAMINOPHEN 5; 325 MG/1; MG/1
1 TABLET ORAL EVERY 4 HOURS PRN
Qty: 5 TABLET | Refills: 0 | Status: SHIPPED | OUTPATIENT
Start: 2020-09-23 | End: 2020-10-03

## 2020-09-23 RX ORDER — CEPHALEXIN 500 MG/1
500 CAPSULE ORAL EVERY 6 HOURS SCHEDULED
Qty: 20 CAPSULE | Refills: 0 | Status: SHIPPED | OUTPATIENT
Start: 2020-09-23 | End: 2020-09-28

## 2020-09-23 RX ORDER — ALBUTEROL SULFATE 2.5 MG/3ML
2.5 SOLUTION RESPIRATORY (INHALATION) ONCE AS NEEDED
Status: DISCONTINUED | OUTPATIENT
Start: 2020-09-23 | End: 2020-09-23 | Stop reason: HOSPADM

## 2020-09-23 RX ORDER — SODIUM CHLORIDE 9 MG/ML
125 INJECTION, SOLUTION INTRAVENOUS CONTINUOUS
Status: DISCONTINUED | OUTPATIENT
Start: 2020-09-23 | End: 2020-09-23 | Stop reason: HOSPADM

## 2020-09-23 RX ORDER — CEFAZOLIN SODIUM 2 G/50ML
2000 SOLUTION INTRAVENOUS
Status: DISCONTINUED | OUTPATIENT
Start: 2020-09-23 | End: 2020-09-23 | Stop reason: HOSPADM

## 2020-09-23 RX ORDER — FENTANYL CITRATE/PF 50 MCG/ML
25 SYRINGE (ML) INJECTION
Status: DISCONTINUED | OUTPATIENT
Start: 2020-09-23 | End: 2020-09-23 | Stop reason: HOSPADM

## 2020-09-23 RX ORDER — LIDOCAINE HYDROCHLORIDE 10 MG/ML
0.5 INJECTION, SOLUTION EPIDURAL; INFILTRATION; INTRACAUDAL; PERINEURAL ONCE AS NEEDED
Status: DISCONTINUED | OUTPATIENT
Start: 2020-09-23 | End: 2020-09-23 | Stop reason: HOSPADM

## 2020-09-23 RX ORDER — MIDAZOLAM HYDROCHLORIDE 2 MG/2ML
INJECTION, SOLUTION INTRAMUSCULAR; INTRAVENOUS AS NEEDED
Status: DISCONTINUED | OUTPATIENT
Start: 2020-09-23 | End: 2020-09-23

## 2020-09-23 RX ORDER — PROPOFOL 10 MG/ML
INJECTION, EMULSION INTRAVENOUS CONTINUOUS PRN
Status: DISCONTINUED | OUTPATIENT
Start: 2020-09-23 | End: 2020-09-23

## 2020-09-23 RX ORDER — SODIUM CHLORIDE, SODIUM LACTATE, POTASSIUM CHLORIDE, CALCIUM CHLORIDE 600; 310; 30; 20 MG/100ML; MG/100ML; MG/100ML; MG/100ML
50 INJECTION, SOLUTION INTRAVENOUS CONTINUOUS
Status: DISCONTINUED | OUTPATIENT
Start: 2020-09-23 | End: 2020-09-23 | Stop reason: HOSPADM

## 2020-09-23 RX ORDER — FENTANYL CITRATE 50 UG/ML
INJECTION, SOLUTION INTRAMUSCULAR; INTRAVENOUS AS NEEDED
Status: DISCONTINUED | OUTPATIENT
Start: 2020-09-23 | End: 2020-09-23

## 2020-09-23 RX ORDER — ACETAMINOPHEN 325 MG/1
650 TABLET ORAL ONCE
Status: COMPLETED | OUTPATIENT
Start: 2020-09-23 | End: 2020-09-23

## 2020-09-23 RX ORDER — LIDOCAINE HYDROCHLORIDE 10 MG/ML
INJECTION, SOLUTION EPIDURAL; INFILTRATION; INTRACAUDAL; PERINEURAL AS NEEDED
Status: DISCONTINUED | OUTPATIENT
Start: 2020-09-23 | End: 2020-09-23

## 2020-09-23 RX ADMIN — ONDANSETRON 4 MG: 2 INJECTION INTRAMUSCULAR; INTRAVENOUS at 11:56

## 2020-09-23 RX ADMIN — SODIUM CHLORIDE 125 ML/HR: 0.9 INJECTION, SOLUTION INTRAVENOUS at 11:14

## 2020-09-23 RX ADMIN — PROPOFOL 130 MCG/KG/MIN: 10 INJECTION, EMULSION INTRAVENOUS at 11:47

## 2020-09-23 RX ADMIN — ACETAMINOPHEN 650 MG: 325 TABLET ORAL at 13:44

## 2020-09-23 RX ADMIN — MIDAZOLAM 2 MG: 1 INJECTION INTRAMUSCULAR; INTRAVENOUS at 11:40

## 2020-09-23 RX ADMIN — CEFAZOLIN SODIUM 2000 MG: 2 SOLUTION INTRAVENOUS at 11:15

## 2020-09-23 RX ADMIN — FENTANYL CITRATE 50 MCG: 50 INJECTION, SOLUTION INTRAMUSCULAR; INTRAVENOUS at 11:58

## 2020-09-23 RX ADMIN — FENTANYL CITRATE 50 MCG: 50 INJECTION, SOLUTION INTRAMUSCULAR; INTRAVENOUS at 11:54

## 2020-09-23 NOTE — DISCHARGE INSTRUCTIONS
Dr Sherryle Fuss    1  Take your prescribed medication as directed  2  Upon arrival at home, lie down and elevate your surgical foot on 2 pillows  3  Remain quiet, off your feet as much as possible, for the first 24-48 hours  This is when your feet first swell and may become painful  After 48 hours you may begin limited walking following these restrictions:   Nonweightbearing to surgical foot  4  Drink large quantities of water  Consume no alcohol  Continue a well-balanced diet  5  Report any unusual discomfort or fever to this office  6  A limited amount of discomfort and swelling is to be expected  In some cases the skin may take on a bruised appearance  The surgical solution that was applied to your foot prior to the operation is dark in color and the operation site may appear to be oozing when it actually is not  7  A slight amount of blood is to be expected, and is no cause for alarm  Do not remove the dressings  If there is active bleeding and if the bleeding persists, add additional gauze to the bandage, apply direct pressure, elevate your feet and call this office  8  Do not get the dressings wet  As regular bathing may be inconvenient, sponge baths are recommended  9  When anesthesia wears off and if any discomfort should be present, apply an ice pack directly over the operated area for 15 minute intervals for several hours or until the pain leaves  (USE IN EXCESS OF 15 MINUTES COULD CAUSE FROSTBITE)  Do not use hot water bags or electric pads  A convenient icepack can be made by placing ice cubes in a plastic bag and covering this with a towel  10  If necessary, take a mild laxative before retiring  11  Having performed the operation, we are interested in a prompt recovery  Please cooperate by following the above instructions  12  Please call to confirm your post-op appointment or call with any other questions

## 2020-09-23 NOTE — PROGRESS NOTES
Pt asked if she should re-apply her bone stimulator, and she also asked if she could get an aircast to protect her foot  Attempted tiger text to resident - both resident and Dr Dania Varghese in the OR  Nelson Alvarez (OR charge) and was put through to OR room  Dr Dania Varghese stated she could re-apply bone stimulator and he would give her a surgical shoe/air cast in the office

## 2020-09-23 NOTE — DISCHARGE SUMMARY
Discharge Summary Outpatient Procedure Podiatry -   Naldo Horace 64 y o  female MRN: [id number]  Unit/Bed#: OR POOL Encounter: [id number]    Admission Date: 9/23/2020     Admitting Diagnosis: Charcot's joint, left ankle and foot [M14 672]    Discharge Diagnosis: same    Procedures Performed: LEFT FOOT FRAME REMOVAL:     Complications: none    Condition at Discharge: stable    Discharge instructions/Information to patient and family:   See after visit summary for information provided to patient and family  Provisions for Follow-Up Care/Important appointments:  See after visit summary for information related to follow-up care and any pertinent home health orders  Discharge Medications:  See after visit summary for reconciled discharge medications provided to patient and family

## 2020-09-23 NOTE — ANESTHESIA POSTPROCEDURE EVALUATION
Post-Op Assessment Note    CV Status:  Stable  Pain Score: 2    Pain management: adequate     Mental Status:  Alert and awake   Hydration Status:  Euvolemic   PONV Controlled:  Controlled   Airway Patency:  Patent      Post Op Vitals Reviewed: Yes      Staff: Anesthesiologist, CRNA         No complications documented      BP      Temp      Pulse     Resp      SpO2

## 2020-09-23 NOTE — OP NOTE
OPERATIVE REPORT  PATIENT NAME: Marita Almanzar    :  1960  MRN: [id number]  Pt Location: [facility] OR ROOM 03    SURGERY DATE: 2020    Surgeon(s) and Role: * Tamra Homans, DPM - Primary     * Yamileth Lane DPM - Assisting    Preop Diagnosis:  Charcot's joint, left ankle and foot [M14 672]  Non-pressure chronic ulcer of other part of right foot with fat layer exposed ([address] ) [L97 512]    Post-Op Diagnosis Codes:     * Charcot's joint, left ankle and foot [M14 672]     * Non-pressure chronic ulcer of other part of right foot with fat layer exposed ([address] ) [L97 512]    Procedure(s) (LRB):  LEFT FOOT FRAME REMOVAL (N/A)    Specimen(s):  * No specimens in log *    Estimated Blood Loss:   Minimal    Drains:  * No LDAs found *    Anesthesia Type:   IV Sedation with Anesthesia    Operative Indications:  Charcot's joint, left ankle and foot [M14 672]    Operative Findings:  Consistent with diagnosis    Complications:   None    Procedure and Technique:    Under mild sedation, the patient was brought into the operating room and placed on the operating room table in the supine position  A time out was performed to confirm the correct patient, procedure and site with all parties in agreement  Following IV sedation, the foot and external fixator was then vigorously scrubbed, prepped with betadine  All of the exfix pins were cut and the ex tourniquet fixator removed full  All pins were removed in full from the foot and leg  The pin sites were then curetted to bleed  All sutures and staples removed  The skin was scrubbed and cleansed with Betadine, saline and dried  Xeroform, 4x4, James compressing dressing applied  The patient tolerated the procedure and anesthesia well and was transported to the PACU with vital signs stable  Dr Abdullahi Contreras was present for the entire procedure and participated in all he surgical elements      As with many limb salvage procedures, we contemplate the possibility of performing further stages to this procedure  Procedures may include debridements, delayed closure, plastic surgery techniques, or more proximal amputations  This procedure may be considered part of a multi-staged limb salvage treatment plan       Patient Disposition:  PACU     SIGNATURE: Aleatha Merlin, DPM  DATE: September 23, 2020  TIME: 12:19 PM

## 2020-09-23 NOTE — ANESTHESIA PREPROCEDURE EVALUATION
Review of Systems/Medical History  Patient summary reviewed  Chart reviewed  No history of anesthetic complications     Cardiovascular  Hypertension controlled,    Pulmonary  Negative pulmonary ROS        GI/Hepatic  Negative GI/hepatic ROS          Negative  ROS        Endo/Other  Diabetes well controlled type 2 Insulin,      GYN  Negative gynecology ROS          Hematology  Negative hematology ROS      Musculoskeletal  Negative musculoskeletal ROS   Comment: Charcot foot,fractures and ulcers      Neurology  Negative neurology ROS      Psychology   Negative psychology ROS              Physical Exam    Airway    Mallampati score: II  TM Distance: >3 FB  Neck ROM: full     Dental   No notable dental hx     Cardiovascular  Rhythm: regular, Rate: normal, Cardiovascular exam normal    Pulmonary  Pulmonary exam normal Breath sounds clear to auscultation,     Other Findings        Anesthesia Plan  ASA Score- 2     Anesthesia Type- IV sedation with anesthesia with ASA Monitors  Additional Monitors:   Airway Plan:     Comment: Hx delayed emergence after 10 hour cancer surgery          Plan Factors-    Chart reviewed  Patient summary reviewed  Patient did not smoke on day of surgery  Induction- intravenous  Postoperative Plan-     Informed Consent- Anesthetic plan and risks discussed with patient and spouse

## 2020-10-14 ENCOUNTER — ANESTHESIA EVENT (OUTPATIENT)
Dept: PERIOP | Facility: HOSPITAL | Age: 60
End: 2020-10-14
Payer: COMMERCIAL

## 2020-10-15 ENCOUNTER — APPOINTMENT (OUTPATIENT)
Dept: RADIOLOGY | Facility: HOSPITAL | Age: 60
End: 2020-10-15
Payer: COMMERCIAL

## 2020-10-15 ENCOUNTER — ANESTHESIA (OUTPATIENT)
Dept: PERIOP | Facility: HOSPITAL | Age: 60
End: 2020-10-15
Payer: COMMERCIAL

## 2020-10-15 ENCOUNTER — HOSPITAL ENCOUNTER (OUTPATIENT)
Dept: RADIOLOGY | Facility: HOSPITAL | Age: 60
Setting detail: OUTPATIENT SURGERY
Discharge: HOME | End: 2020-10-15
Payer: COMMERCIAL

## 2020-10-15 ENCOUNTER — HOSPITAL ENCOUNTER (OUTPATIENT)
Facility: HOSPITAL | Age: 60
Setting detail: OUTPATIENT SURGERY
Discharge: HOME | End: 2020-10-15
Attending: PODIATRIST | Admitting: PODIATRIST
Payer: COMMERCIAL

## 2020-10-15 VITALS
RESPIRATION RATE: 18 BRPM | HEIGHT: 62 IN | OXYGEN SATURATION: 96 % | WEIGHT: 149 LBS | BODY MASS INDEX: 27.42 KG/M2 | TEMPERATURE: 98 F | DIASTOLIC BLOOD PRESSURE: 62 MMHG | HEART RATE: 95 BPM | SYSTOLIC BLOOD PRESSURE: 136 MMHG

## 2020-10-15 VITALS — HEART RATE: 84 BPM

## 2020-10-15 DIAGNOSIS — M14.672 CHARCOT'S JOINT, LEFT ANKLE AND FOOT: ICD-10-CM

## 2020-10-15 DIAGNOSIS — Z98.890 STATUS POST SURGERY: ICD-10-CM

## 2020-10-15 DIAGNOSIS — Z98.890 POST-OPERATIVE STATE: Primary | ICD-10-CM

## 2020-10-15 PROBLEM — E10.9 DIABETES MELLITUS TYPE 1 (HCC): Status: ACTIVE | Noted: 2020-10-15

## 2020-10-15 PROBLEM — T88.59XA DELAYED EMERGENCE FROM ANESTHESIA: Status: ACTIVE | Noted: 2020-10-15

## 2020-10-15 PROBLEM — E11.9 DIABETES MELLITUS, TYPE 2 (HCC): Status: ACTIVE | Noted: 2020-10-15

## 2020-10-15 PROBLEM — I10 HTN (HYPERTENSION): Status: ACTIVE | Noted: 2020-10-15

## 2020-10-15 PROBLEM — C55 UTERINE CANCER (HCC): Status: ACTIVE | Noted: 2020-10-15

## 2020-10-15 LAB
GLUCOSE SERPL-MCNC: 106 MG/DL (ref 65–140)
GLUCOSE SERPL-MCNC: 132 MG/DL (ref 65–140)

## 2020-10-15 PROCEDURE — 73630 X-RAY EXAM OF FOOT: CPT

## 2020-10-15 PROCEDURE — C1713 ANCHOR/SCREW BN/BN,TIS/BN: HCPCS | Performed by: PODIATRIST

## 2020-10-15 PROCEDURE — C1769 GUIDE WIRE: HCPCS | Performed by: PODIATRIST

## 2020-10-15 PROCEDURE — 73620 X-RAY EXAM OF FOOT: CPT

## 2020-10-15 PROCEDURE — 82948 REAGENT STRIP/BLOOD GLUCOSE: CPT

## 2020-10-15 DEVICE — NAIL WITH COMPRESSION SCREW
Type: IMPLANTABLE DEVICE | Site: ANKLE | Status: NON-FUNCTIONAL
Brand: T2
Removed: 2021-04-03

## 2020-10-15 DEVICE — NAIL WITH COMPRESSION SCREW
Type: IMPLANTABLE DEVICE | Site: ANKLE | Status: FUNCTIONAL
Brand: T2

## 2020-10-15 DEVICE — BONE SCREW
Type: IMPLANTABLE DEVICE | Site: ANKLE | Status: FUNCTIONAL
Brand: VARIAX

## 2020-10-15 DEVICE — BONE SCREW
Type: IMPLANTABLE DEVICE | Site: ANKLE | Status: FUNCTIONAL
Brand: VARIAX
Removed: 2021-04-03

## 2020-10-15 DEVICE — KIRSCHNER WIRE: Type: IMPLANTABLE DEVICE | Site: ANKLE | Status: FUNCTIONAL

## 2020-10-15 DEVICE — BONE SCREW
Type: IMPLANTABLE DEVICE | Site: ANKLE | Status: NON-FUNCTIONAL
Brand: VARIAX
Removed: 2021-04-03

## 2020-10-15 DEVICE — HEADLESS COMPRESSION SCREW
Type: IMPLANTABLE DEVICE | Site: ANKLE | Status: NON-FUNCTIONAL
Brand: FIXOS
Removed: 2021-04-03

## 2020-10-15 DEVICE — BIOACTIVE BONE GRAFT SUBSTITUTE, FOAM PACK; BETA-TRICALCIUM PHOSPHATE, TYPE I BOVINE COLLAGEN, AND BIOACTIVE GLASS
Type: IMPLANTABLE DEVICE | Status: FUNCTIONAL
Brand: VITOSS BA2X

## 2020-10-15 DEVICE — END CAP
Type: IMPLANTABLE DEVICE | Site: ANKLE | Status: FUNCTIONAL
Brand: T2

## 2020-10-15 RX ORDER — OXYCODONE HYDROCHLORIDE AND ACETAMINOPHEN 5; 325 MG/1; MG/1
1 TABLET ORAL EVERY 4 HOURS PRN
Qty: 25 TABLET | Refills: 0 | Status: SHIPPED | OUTPATIENT
Start: 2020-10-15 | End: 2020-10-25

## 2020-10-15 RX ORDER — OXYCODONE HYDROCHLORIDE 5 MG/1
5 TABLET ORAL EVERY 4 HOURS PRN
Status: DISCONTINUED | OUTPATIENT
Start: 2020-10-15 | End: 2020-10-15 | Stop reason: HOSPADM

## 2020-10-15 RX ORDER — MAGNESIUM HYDROXIDE 1200 MG/15ML
LIQUID ORAL AS NEEDED
Status: DISCONTINUED | OUTPATIENT
Start: 2020-10-15 | End: 2020-10-15 | Stop reason: HOSPADM

## 2020-10-15 RX ORDER — MIDAZOLAM HYDROCHLORIDE 2 MG/2ML
INJECTION, SOLUTION INTRAMUSCULAR; INTRAVENOUS AS NEEDED
Status: DISCONTINUED | OUTPATIENT
Start: 2020-10-15 | End: 2020-10-15

## 2020-10-15 RX ORDER — SODIUM CHLORIDE 9 MG/ML
125 INJECTION, SOLUTION INTRAVENOUS CONTINUOUS
Status: DISCONTINUED | OUTPATIENT
Start: 2020-10-15 | End: 2020-10-15 | Stop reason: HOSPADM

## 2020-10-15 RX ORDER — CEFAZOLIN SODIUM 2 G/50ML
2000 SOLUTION INTRAVENOUS ONCE
Status: COMPLETED | OUTPATIENT
Start: 2020-10-15 | End: 2020-10-15

## 2020-10-15 RX ORDER — FENTANYL CITRATE 50 UG/ML
INJECTION, SOLUTION INTRAMUSCULAR; INTRAVENOUS AS NEEDED
Status: DISCONTINUED | OUTPATIENT
Start: 2020-10-15 | End: 2020-10-15

## 2020-10-15 RX ORDER — FENTANYL CITRATE/PF 50 MCG/ML
50 SYRINGE (ML) INJECTION
Status: DISCONTINUED | OUTPATIENT
Start: 2020-10-15 | End: 2020-10-15 | Stop reason: HOSPADM

## 2020-10-15 RX ORDER — HYDROMORPHONE HCL/PF 1 MG/ML
0.5 SYRINGE (ML) INJECTION
Status: DISCONTINUED | OUTPATIENT
Start: 2020-10-15 | End: 2020-10-15 | Stop reason: HOSPADM

## 2020-10-15 RX ORDER — LIDOCAINE HYDROCHLORIDE 20 MG/ML
INJECTION, SOLUTION INFILTRATION; PERINEURAL AS NEEDED
Status: DISCONTINUED | OUTPATIENT
Start: 2020-10-15 | End: 2020-10-15

## 2020-10-15 RX ORDER — ONDANSETRON 2 MG/ML
INJECTION INTRAMUSCULAR; INTRAVENOUS AS NEEDED
Status: DISCONTINUED | OUTPATIENT
Start: 2020-10-15 | End: 2020-10-15

## 2020-10-15 RX ORDER — ONDANSETRON 2 MG/ML
4 INJECTION INTRAMUSCULAR; INTRAVENOUS ONCE AS NEEDED
Status: DISCONTINUED | OUTPATIENT
Start: 2020-10-15 | End: 2020-10-15 | Stop reason: HOSPADM

## 2020-10-15 RX ORDER — PROPOFOL 10 MG/ML
INJECTION, EMULSION INTRAVENOUS AS NEEDED
Status: DISCONTINUED | OUTPATIENT
Start: 2020-10-15 | End: 2020-10-15

## 2020-10-15 RX ADMIN — FENTANYL CITRATE 50 MCG: 50 INJECTION, SOLUTION INTRAMUSCULAR; INTRAVENOUS at 14:54

## 2020-10-15 RX ADMIN — SODIUM CHLORIDE: 0.9 INJECTION, SOLUTION INTRAVENOUS at 16:15

## 2020-10-15 RX ADMIN — FENTANYL CITRATE 25 MCG: 50 INJECTION, SOLUTION INTRAMUSCULAR; INTRAVENOUS at 15:33

## 2020-10-15 RX ADMIN — LIDOCAINE HYDROCHLORIDE 3 ML: 20 INJECTION, SOLUTION INFILTRATION; PERINEURAL at 13:43

## 2020-10-15 RX ADMIN — ONDANSETRON 4 MG: 2 INJECTION INTRAMUSCULAR; INTRAVENOUS at 13:43

## 2020-10-15 RX ADMIN — FENTANYL CITRATE 25 MCG: 50 INJECTION, SOLUTION INTRAMUSCULAR; INTRAVENOUS at 15:49

## 2020-10-15 RX ADMIN — PHENYLEPHRINE HYDROCHLORIDE 40 MCG/MIN: 10 INJECTION INTRAVENOUS at 14:02

## 2020-10-15 RX ADMIN — PROPOFOL 150 MG: 10 INJECTION, EMULSION INTRAVENOUS at 13:43

## 2020-10-15 RX ADMIN — FENTANYL CITRATE 50 MCG: 50 INJECTION, SOLUTION INTRAMUSCULAR; INTRAVENOUS at 15:20

## 2020-10-15 RX ADMIN — PHENYLEPHRINE HYDROCHLORIDE 100 MCG: 10 INJECTION INTRAVENOUS at 14:01

## 2020-10-15 RX ADMIN — SODIUM CHLORIDE 125 ML/HR: 0.9 INJECTION, SOLUTION INTRAVENOUS at 10:46

## 2020-10-15 RX ADMIN — CEFAZOLIN SODIUM 2000 MG: 2 SOLUTION INTRAVENOUS at 17:10

## 2020-10-15 RX ADMIN — FENTANYL CITRATE 25 MCG: 50 INJECTION, SOLUTION INTRAMUSCULAR; INTRAVENOUS at 13:50

## 2020-10-15 RX ADMIN — CEFAZOLIN SODIUM 2000 MG: 2 SOLUTION INTRAVENOUS at 13:32

## 2020-10-15 RX ADMIN — MIDAZOLAM 2 MG: 1 INJECTION INTRAMUSCULAR; INTRAVENOUS at 13:36

## 2020-10-15 RX ADMIN — FENTANYL CITRATE 25 MCG: 50 INJECTION, SOLUTION INTRAMUSCULAR; INTRAVENOUS at 14:10

## 2021-01-28 DIAGNOSIS — Z23 ENCOUNTER FOR IMMUNIZATION: ICD-10-CM

## 2021-04-02 ENCOUNTER — APPOINTMENT (INPATIENT)
Dept: RADIOLOGY | Facility: HOSPITAL | Age: 61
DRG: 629 | End: 2021-04-02
Payer: COMMERCIAL

## 2021-04-02 ENCOUNTER — APPOINTMENT (OUTPATIENT)
Dept: LAB | Age: 61
DRG: 629 | End: 2021-04-02
Payer: COMMERCIAL

## 2021-04-02 ENCOUNTER — ANESTHESIA EVENT (INPATIENT)
Dept: PERIOP | Facility: HOSPITAL | Age: 61
DRG: 629 | End: 2021-04-02
Payer: COMMERCIAL

## 2021-04-02 ENCOUNTER — HOSPITAL ENCOUNTER (INPATIENT)
Facility: HOSPITAL | Age: 61
LOS: 6 days | Discharge: HOME | DRG: 629 | End: 2021-04-08
Attending: PODIATRIST | Admitting: PODIATRIST
Payer: COMMERCIAL

## 2021-04-02 ENCOUNTER — TRANSCRIBE ORDERS (OUTPATIENT)
Dept: URGENT CARE | Age: 61
End: 2021-04-02

## 2021-04-02 DIAGNOSIS — E11.628 DIABETIC FOOT INFECTION (HCC): ICD-10-CM

## 2021-04-02 DIAGNOSIS — Z79.4 TYPE 2 DIABETES MELLITUS WITH OTHER SPECIFIED COMPLICATION, WITH LONG-TERM CURRENT USE OF INSULIN (HCC): ICD-10-CM

## 2021-04-02 DIAGNOSIS — I10 HYPERTENSION, UNSPECIFIED TYPE: Primary | ICD-10-CM

## 2021-04-02 DIAGNOSIS — L08.9 DIABETIC FOOT INFECTION (HCC): ICD-10-CM

## 2021-04-02 DIAGNOSIS — L03.116 CELLULITIS OF LEFT FOOT: Primary | ICD-10-CM

## 2021-04-02 DIAGNOSIS — B37.3 VAGINAL YEAST INFECTION: ICD-10-CM

## 2021-04-02 DIAGNOSIS — L03.116 CELLULITIS OF LEFT FOOT: ICD-10-CM

## 2021-04-02 DIAGNOSIS — M14.672 CHARCOT'S JOINT OF LEFT FOOT: ICD-10-CM

## 2021-04-02 DIAGNOSIS — L03.116 CELLULITIS OF LEFT LOWER EXTREMITY: ICD-10-CM

## 2021-04-02 DIAGNOSIS — E11.69 TYPE 2 DIABETES MELLITUS WITH OTHER SPECIFIED COMPLICATION, WITH LONG-TERM CURRENT USE OF INSULIN (HCC): ICD-10-CM

## 2021-04-02 LAB
ANION GAP SERPL CALCULATED.3IONS-SCNC: 2 MMOL/L (ref 4–13)
BASOPHILS # BLD AUTO: 0.04 THOUSANDS/ΜL (ref 0–0.1)
BASOPHILS NFR BLD AUTO: 1 % (ref 0–1)
BUN SERPL-MCNC: 21 MG/DL (ref 5–25)
CALCIUM SERPL-MCNC: 9.6 MG/DL (ref 8.3–10.1)
CHLORIDE SERPL-SCNC: 111 MMOL/L (ref 100–108)
CO2 SERPL-SCNC: 29 MMOL/L (ref 21–32)
CREAT SERPL-MCNC: 0.85 MG/DL (ref 0.6–1.3)
EOSINOPHIL # BLD AUTO: 0.21 THOUSAND/ΜL (ref 0–0.61)
EOSINOPHIL NFR BLD AUTO: 4 % (ref 0–6)
ERYTHROCYTE [DISTWIDTH] IN BLOOD BY AUTOMATED COUNT: 13.6 % (ref 11.6–15.1)
ERYTHROCYTE [SEDIMENTATION RATE] IN BLOOD: 37 MM/HOUR (ref 0–29)
GFR SERPL CREATININE-BSD FRML MDRD: 76 ML/MIN/1.73SQ M
GLUCOSE SERPL-MCNC: 101 MG/DL (ref 65–140)
GLUCOSE SERPL-MCNC: 143 MG/DL (ref 65–140)
GLUCOSE SERPL-MCNC: 169 MG/DL (ref 65–140)
HCT VFR BLD AUTO: 36 % (ref 34.8–46.1)
HGB BLD-MCNC: 11.6 G/DL (ref 11.5–15.4)
IMM GRANULOCYTES # BLD AUTO: 0.02 THOUSAND/UL (ref 0–0.2)
IMM GRANULOCYTES NFR BLD AUTO: 0 % (ref 0–2)
LYMPHOCYTES # BLD AUTO: 1.24 THOUSANDS/ΜL (ref 0.6–4.47)
LYMPHOCYTES NFR BLD AUTO: 22 % (ref 14–44)
MCH RBC QN AUTO: 28.9 PG (ref 26.8–34.3)
MCHC RBC AUTO-ENTMCNC: 32.2 G/DL (ref 31.4–37.4)
MCV RBC AUTO: 90 FL (ref 82–98)
MONOCYTES # BLD AUTO: 0.4 THOUSAND/ΜL (ref 0.17–1.22)
MONOCYTES NFR BLD AUTO: 7 % (ref 4–12)
NEUTROPHILS # BLD AUTO: 3.75 THOUSANDS/ΜL (ref 1.85–7.62)
NEUTS SEG NFR BLD AUTO: 66 % (ref 43–75)
NRBC BLD AUTO-RTO: 0 /100 WBCS
PLATELET # BLD AUTO: 287 THOUSANDS/UL (ref 149–390)
PLATELET # BLD AUTO: 310 THOUSANDS/UL (ref 149–390)
PMV BLD AUTO: 10.2 FL (ref 8.9–12.7)
PMV BLD AUTO: 10.6 FL (ref 8.9–12.7)
POTASSIUM SERPL-SCNC: 4.8 MMOL/L (ref 3.5–5.3)
RBC # BLD AUTO: 4.01 MILLION/UL (ref 3.81–5.12)
SODIUM SERPL-SCNC: 142 MMOL/L (ref 136–145)
WBC # BLD AUTO: 5.66 THOUSAND/UL (ref 4.31–10.16)

## 2021-04-02 PROCEDURE — 93005 ELECTROCARDIOGRAM TRACING: CPT

## 2021-04-02 PROCEDURE — 71045 X-RAY EXAM CHEST 1 VIEW: CPT

## 2021-04-02 PROCEDURE — 80048 BASIC METABOLIC PNL TOTAL CA: CPT

## 2021-04-02 PROCEDURE — 85652 RBC SED RATE AUTOMATED: CPT

## 2021-04-02 PROCEDURE — 73630 X-RAY EXAM OF FOOT: CPT

## 2021-04-02 PROCEDURE — 85049 AUTOMATED PLATELET COUNT: CPT | Performed by: STUDENT IN AN ORGANIZED HEALTH CARE EDUCATION/TRAINING PROGRAM

## 2021-04-02 PROCEDURE — 82948 REAGENT STRIP/BLOOD GLUCOSE: CPT

## 2021-04-02 PROCEDURE — 85025 COMPLETE CBC W/AUTO DIFF WBC: CPT

## 2021-04-02 PROCEDURE — 36415 COLL VENOUS BLD VENIPUNCTURE: CPT

## 2021-04-02 RX ORDER — ASCORBIC ACID 500 MG
250 TABLET ORAL DAILY
Status: DISCONTINUED | OUTPATIENT
Start: 2021-04-03 | End: 2021-04-08 | Stop reason: HOSPADM

## 2021-04-02 RX ORDER — METRONIDAZOLE 500 MG/1
500 TABLET ORAL EVERY 8 HOURS SCHEDULED
Status: DISCONTINUED | OUTPATIENT
Start: 2021-04-02 | End: 2021-04-05

## 2021-04-02 RX ORDER — ASPIRIN 81 MG/1
81 TABLET, CHEWABLE ORAL DAILY
Status: DISCONTINUED | OUTPATIENT
Start: 2021-04-03 | End: 2021-04-08 | Stop reason: HOSPADM

## 2021-04-02 RX ORDER — LOSARTAN POTASSIUM 25 MG/1
25 TABLET ORAL DAILY
Status: DISCONTINUED | OUTPATIENT
Start: 2021-04-03 | End: 2021-04-08 | Stop reason: HOSPADM

## 2021-04-02 RX ORDER — CEFAZOLIN SODIUM 2 G/50ML
2000 SOLUTION INTRAVENOUS EVERY 8 HOURS
Status: DISCONTINUED | OUTPATIENT
Start: 2021-04-02 | End: 2021-04-07

## 2021-04-02 RX ORDER — SODIUM CHLORIDE 9 MG/ML
75 INJECTION, SOLUTION INTRAVENOUS CONTINUOUS
Status: DISCONTINUED | OUTPATIENT
Start: 2021-04-03 | End: 2021-04-04

## 2021-04-02 RX ORDER — INSULIN GLARGINE 100 [IU]/ML
18 INJECTION, SOLUTION SUBCUTANEOUS
Status: DISCONTINUED | OUTPATIENT
Start: 2021-04-02 | End: 2021-04-08 | Stop reason: HOSPADM

## 2021-04-02 RX ORDER — AMLODIPINE BESYLATE 5 MG/1
5 TABLET ORAL DAILY
Status: DISCONTINUED | OUTPATIENT
Start: 2021-04-03 | End: 2021-04-08 | Stop reason: HOSPADM

## 2021-04-02 RX ADMIN — CEFAZOLIN SODIUM 2000 MG: 2 SOLUTION INTRAVENOUS at 17:32

## 2021-04-02 RX ADMIN — METRONIDAZOLE 500 MG: 500 TABLET ORAL at 17:31

## 2021-04-02 RX ADMIN — INSULIN LISPRO 1 UNITS: 100 INJECTION, SOLUTION INTRAVENOUS; SUBCUTANEOUS at 21:31

## 2021-04-02 RX ADMIN — INSULIN GLARGINE 18 UNITS: 100 INJECTION, SOLUTION SUBCUTANEOUS at 21:32

## 2021-04-02 RX ADMIN — ENOXAPARIN SODIUM 40 MG: 40 INJECTION SUBCUTANEOUS at 21:33

## 2021-04-02 NOTE — PLAN OF CARE
Problem: Potential for Falls  Goal: Patient will remain free of falls  Description: INTERVENTIONS:  - Assess patient frequently for physical needs  -  Identify cognitive and physical deficits and behaviors that affect risk of falls  -  [place] fall precautions as indicated by assessment   - Educate patient/family on patient safety including physical limitations  - Instruct patient to call for assistance with activity based on assessment  - Modify environment to reduce risk of injury  - Consider OT/PT consult to assist with strengthening/mobility  Outcome: Progressing     Problem: METABOLIC, FLUID AND ELECTROLYTES - ADULT  Goal: Glucose maintained within target range  Description: INTERVENTIONS:  - Monitor Blood Glucose as ordered  - Assess for signs and symptoms of hyperglycemia and hypoglycemia  - Administer ordered medications to maintain glucose within target range  - Assess nutritional intake and initiate nutrition service referral as needed  Outcome: Progressing     Problem: PAIN - ADULT  Goal: Verbalizes/displays adequate comfort level or baseline comfort level  Description: Interventions:  - Encourage patient to monitor pain and request assistance  - Assess pain using appropriate pain scale  - Administer analgesics based on type and severity of pain and evaluate response  - Implement non-pharmacological measures as appropriate and evaluate response  - Consider cultural and social influences on pain and pain management  - Notify physician/advanced practitioner if interventions unsuccessful or patient reports new pain  Outcome: Progressing     Problem: SAFETY ADULT  Goal: Patient will remain free of falls  Description: INTERVENTIONS:  - Assess patient frequently for physical needs  -  Identify cognitive and physical deficits and behaviors that affect risk of falls    -  [place] fall precautions as indicated by assessment   - Educate patient/family on patient safety including physical limitations  - Instruct patient to call for assistance with activity based on assessment  - Modify environment to reduce risk of injury  - Consider OT/PT consult to assist with strengthening/mobility  Outcome: Progressing  Goal: Maintain or return to baseline ADL function  Description: INTERVENTIONS:  -  Assess patient's ability to carry out ADLs; assess patient's baseline for ADL function and identify physical deficits which impact ability to perform ADLs (bathing, care of mouth/teeth, toileting, grooming, dressing, etc )  - Assess/evaluate cause of self-care deficits   - Assess range of motion  - Assess patient's mobility; develop plan if impaired  - Assess patient's need for assistive devices and provide as appropriate  - Encourage maximum independence but intervene and supervise when necessary  - Involve family in performance of ADLs  - Assess for home care needs following discharge   - Consider OT consult to assist with ADL evaluation and planning for discharge  - Provide patient education as appropriate  Outcome: Progressing  Goal: Maintain or return mobility status to optimal level  Description: INTERVENTIONS:  - Assess patient's baseline mobility status (ambulation, transfers, stairs, etc )    - Identify cognitive and physical deficits and behaviors that affect mobility  - Identify mobility aids required to assist with transfers and/or ambulation (gait belt, sit-to-stand, lift, walker, cane, etc )  - [place] fall precautions as indicated by assessment  - Record patient progress and toleration of activity level on Mobility SBAR; progress patient to next Phase/Stage  - Instruct patient to call for assistance with activity based on assessment  - Consider rehabilitation consult to assist with strengthening/weightbearing, etc   Outcome: Progressing     Problem: DISCHARGE PLANNING  Goal: Discharge to home or other facility with appropriate resources  Description: INTERVENTIONS:  - Identify barriers to discharge w/patient and caregiver  - Arrange for needed discharge resources and transportation as appropriate  - Identify discharge learning needs (meds, wound care, etc )  - Arrange for interpretive services to assist at discharge as needed  - Refer to Case Management Department for coordinating discharge planning if the patient needs post-hospital services based on physician/advanced practitioner order or complex needs related to functional status, cognitive ability, or social support system  Outcome: Progressing     Problem: Knowledge Deficit  Goal: Patient/family/caregiver demonstrates understanding of disease process, treatment plan, medications, and discharge instructions  Description: Complete learning assessment and assess knowledge base    Interventions:  - Provide teaching at level of understanding  - Provide teaching via preferred learning methods  Outcome: Progressing

## 2021-04-02 NOTE — H&P
[address] [facility] - H&P  Osvaldo Pineda 64 y o  female MRN: [id number]  Unit/Bed#: E2 -01 Encounter: [id number]  Admission Date: 04/02/21    ASSESSMENT:    Osvaldo Pineda is a 64 y o  female with:    1  Left foot ulceration holman 3  2  Left foot cellulitis  3  Left foot charcot neuroarthropathy s/p reconstruction  4  Diabetes Type 2, A1c 5 8% 7/23/2020      PLAN:    · Will plan to take to OR 4/3 for I&D and hardware removal with Dr Sanjana Santiago  · Radiograph personally reviewed showing heterotrophic ossefication of 5th metatarsal base and lucency of beam and IM nail  · Will start on Ancef/flagyl emperically  · Appreciate SLIM medication recommendations for insulin regimine  · Started on lovenox for DVT prophylaxis  · Will discuss this plan with my attending and update as needed  Antibiotics started: Ancef and metronidazole  Pharmacologic VTE Prophylaxis: Enoxaparin (Lovenox)   Mechanical VTE Prophylaxis: sequential compression device       Disposition:  Patient requires >2 midnight stay for surgical intervention  SUBJECTIVE    History of Present Illness:    Osvaldo Pineda is a 64 y o  female who presents with worsening infection of left lateral foot  She has extensive history of left foot Charcot neuro arthropathy with multiple reconstructive procedures being undergone last year  She states that approximately 1 week ago 03/27/2021 she began noticing itching sensation of left lateral foot  After scratching this next day 3/28 her  noticed that there was an opening on her left foot  She began to notice blood draining from the wound and redness surrounding the new ulcer  She was seen in the office by Carolina Dotson and prescribed Bactrim she did not have significant improvement of wound or surrounding redness and was seen again in the office today by Dr Camargo    Radiographs were again reviewed by Dr Sanjana Santiago and there was concern for loosening of hardware and was discussed that she should go to the hospital for surgical workup  She denies nausea, vomiting, fever, chills, shortness of breath, constipation, diarrhea, blurry vision    Review of Systems:    Constitutional: Negative  HENT: Negative  Eyes: Negative  Respiratory: Negative  Cardiovascular: Negative  Gastrointestinal: Negative  Musculoskeletal: left foot pain and deformity   Skin:left foot ulcer   Neurological: left foot neuropathy  Psych: Negative       Past Medical and Surgical History:     Past Medical History:   Diagnosis Date    Anesthesia complication     difficulty awakening    Arthritis     right hand middle finger    Cancer ([facility] )     uterine- radical  hysterectomy/ chemo and radiation    Charcot's joint, left ankle and foot     Colon polyp     Diabetes mellitus ([facility] )     IDDM    Hypertension     Ulcer of great toe ([facility] )     right- pt changes dsg daily    Unable to bear weight     LLE- using knee scooter       Past Surgical History:   Procedure Laterality Date     SECTION      x 3    COLONOSCOPY      FOOT FUSION Left 10/15/2020    Procedure: CHARCOT RECONSTRUCTION WITH MIDFOOT, MEDIAL COLUMN FUSION; INTERNAL, achiles lengthening;  Surgeon: Melva Georges DPM;  [facility];  Service: Podiatry    HARDWARE REMOVAL N/A 2020    Procedure: LEFT FOOT FRAME REMOVAL;  Surgeon: Melva Georges DPM;  [facility];  Service: Podiatry    HYSTERECTOMY      JOINT REPLACEMENT Right     TKR    LA APPLY BONE UNIPLANE,EXT FIX DEV Left 2020    Procedure: EX-FIX APPLICATION;  Surgeon: Melva Georges DPM;  [facility];  Service: Podiatry    LA ELECT BONE STIM NONINVASIVE Left 2020    Procedure: BONE STIM APPLICATION & activation;  Surgeon: Melva Georges DPM;  [facility];  Service: Podiatry    LA GASTROCNEMIUS RECESSION Left 2020    Procedure: ENDO GASTROC RECESSION;  Surgeon: Melva Georges DPM;  [facility];  Service: [address] [place] [address] Bilateral 7/30/2020    Procedure: DEBRIDE ULCER W/GRAFT APPS;  Surgeon: Melva Georges DPM;  [facility];  Service: Podiatry       Meds/Allergies:    Medications Prior to Admission   Medication    amLODIPine (NORVASC) 5 mg tablet    APPLE CIDER VINEGAR PO    ascorbic acid (VITAMIN C) 250 mg tablet    ASPIRIN 81 PO    celecoxib (CeleBREX) 100 mg capsule    enoxaparin (LOVENOX) 40 mg/0 4 mL    insulin glargine (LANTUS) 100 units/mL subcutaneous injection    losartan (COZAAR) 25 mg tablet    metFORMIN (GLUCOPHAGE) 500 mg tablet    Multiple Vitamin (MULTIVITAMIN) tablet    sitaGLIPtin (JANUVIA) 50 mg tablet       No Known Allergies    Social History:    Social History     Marital Status: /Civil Union    Substance Use History:   Social History     Substance and Sexual Activity   Alcohol Use Yes    Frequency: Monthly or less    Drinks per session: 1 or 2    Comment: rarely     Social History     Tobacco Use   Smoking Status Never Smoker   Smokeless Tobacco Never Used     Social History     Substance and Sexual Activity   Drug Use Never       Family History:    No family history on file  OBJECTIVE:    First Vitals:        Current Vitals:          Physical Exam    General Appearance: Alert, cooperative, no distress  HEENT: Head normocephalic, atraumatic, without obvious abnormality  Heart: Normal rate and rhythm  No murmurs or gallops noted  Lungs: Non-labored breathing  No respiratory distress  No wheezing, rhonchi, or rales  Abdomen:  Soft, nontender, without distension  Psychiatric: AAOx3  Lower Extremity:  Vascular:   Pedal pulses are present  CRT < 3 seconds at the digits  +0/4 edema noted at bilateral lower extremities  Pedal hair is absent  Skin temperature is WNL bilaterally  Musculoskeletal:  MMT is 5/5 in all muscle compartments bilaterally  ROM at the 1st MPJ and ankle joint are WNL bilaterally with the leg extended  No Pain on palpation of left lateral foot  Dermatological:   Lower extremity wounds as noted below:    Wound (1) located left lateral foot, measures approximately 1 0 cm x 0 5 cm x 0 3 cm  with sinus tracking or undermining  Wound bed appears granular with serosanguinous drainage  Deepest tissue noted in base is bone  Malodor is not noticed  Wound edge appears non-attached  Alma-wound skin appears intact, erythematous and indurated  Probe to bone is positive  Signs of infection are present at this time  Wound 2 located right medial hallux, measures 0 3 cm x 0 2 cm x 0 1 cm  without sinus tracking or undermining  Wound bed appears pink/red with light Serous exudate  Deepest tissue noted Bone  Malodor is not noticed  Wound edge appears Attached and epithelialized  Alma-wound skin appears intact  Probe to bone is,  negative   Signs of infection are not present at this time  Neurological:  Gross sensation is absent  Protective sensation is absent  Patient Reports numbness and/or paresthesias      Clinical Images 04/02/21:    right medial hallux    Left lateral foot    Left lateral foot      Lab Results:   Admission on 04/02/2021   Component Date Value    POC Glucose 04/02/2021 143*   Appointment on 04/02/2021   Component Date Value    WBC 04/02/2021 5 66     RBC 04/02/2021 4 01     Hemoglobin 04/02/2021 11 6     Hematocrit 04/02/2021 36 0     MCV 04/02/2021 90     MCH 04/02/2021 28 9     MCHC 04/02/2021 32 2     RDW 04/02/2021 13 6     MPV 04/02/2021 10 6     Platelets 25/81/4380 310     nRBC 04/02/2021 0     Neutrophils Relative 04/02/2021 66     Immat GRANS % 04/02/2021 0     Lymphocytes Relative 04/02/2021 22     Monocytes Relative 04/02/2021 7     Eosinophils Relative 04/02/2021 4     Basophils Relative 04/02/2021 1     Neutrophils Absolute 04/02/2021 3 75     Immature Grans Absolute 04/02/2021 0 02     Lymphocytes Absolute 04/02/2021 1 24     Monocytes Absolute 04/02/2021 0 40     Eosinophils Absolute 04/02/2021 0 21  Basophils Absolute 04/02/2021 0 04     Sed Rate 04/02/2021 37*    Sodium 04/02/2021 142     Potassium 04/02/2021 4 8     Chloride 04/02/2021 111*    CO2 04/02/2021 29     ANION GAP 04/02/2021 2*    BUN 04/02/2021 21     Creatinine 04/02/2021 0 85     Glucose 04/02/2021 101     Calcium 04/02/2021 9 6     eGFR 04/02/2021 76        Cultures:            Imaging: I have personally reviewed pertinent films in PACS  No results found  Pathology, and Other Studies: I have personally reviewed pertinent reports        Code Status: Level 1 - Full Code

## 2021-04-02 NOTE — ANESTHESIA PREPROCEDURE EVALUATION
Procedure:  INCISION AND DRAINAGE (I&D) EXTREMITY (Left Foot)    Relevant Problems   ANESTHESIA   (+) Delayed emergence from anesthesia      CARDIO   (+) HTN (hypertension)      ENDO   (+) Diabetes mellitus, type 2 (HCC)      GYN   (+) Uterine cancer (HCC)      MUSCULOSKELETAL   (+) Charcot's joint of left foot        Physical Exam    Airway    Mallampati score: I  TM Distance: >3 FB  Neck ROM: full     Dental   No notable dental hx     Cardiovascular  Cardiovascular exam normal    Pulmonary  Pulmonary exam normal     Other Findings        Anesthesia Plan  ASA Score- 2 Emergent    Anesthesia Type- general with ASA Monitors  Additional Monitors:   Airway Plan:     Comment: Had sore throat and PONV after most recent surgery 10/20 with LMA #3  Patricio Goody Plan Factors-    Chart reviewed  Induction- intravenous  Postoperative Plan-     Informed Consent- Anesthetic plan and risks discussed with patient

## 2021-04-03 ENCOUNTER — APPOINTMENT (INPATIENT)
Dept: RADIOLOGY | Facility: HOSPITAL | Age: 61
DRG: 629 | End: 2021-04-03
Payer: COMMERCIAL

## 2021-04-03 ENCOUNTER — ANESTHESIA (INPATIENT)
Dept: PERIOP | Facility: HOSPITAL | Age: 61
DRG: 629 | End: 2021-04-03
Payer: COMMERCIAL

## 2021-04-03 LAB
ATRIAL RATE: 88 BPM
GLUCOSE SERPL-MCNC: 146 MG/DL (ref 65–140)
GLUCOSE SERPL-MCNC: 173 MG/DL (ref 65–140)
GLUCOSE SERPL-MCNC: 93 MG/DL (ref 65–140)
GLUCOSE SERPL-MCNC: 94 MG/DL (ref 65–140)
P AXIS: 64 DEGREES
PR INTERVAL: 146 MS
QRS AXIS: 50 DEGREES
QRSD INTERVAL: 96 MS
QT INTERVAL: 382 MS
QTC INTERVAL: 462 MS
T WAVE AXIS: 84 DEGREES
VENTRICULAR RATE: 88 BPM

## 2021-04-03 PROCEDURE — 88307 TISSUE EXAM BY PATHOLOGIST: CPT | Performed by: PATHOLOGY

## 2021-04-03 PROCEDURE — 73620 X-RAY EXAM OF FOOT: CPT

## 2021-04-03 PROCEDURE — 88304 TISSUE EXAM BY PATHOLOGIST: CPT | Performed by: PATHOLOGY

## 2021-04-03 PROCEDURE — C1769 GUIDE WIRE: HCPCS | Performed by: PODIATRIST

## 2021-04-03 PROCEDURE — 0QBP0ZZ EXCISION OF LEFT METATARSAL, OPEN APPROACH: ICD-10-PCS | Performed by: PODIATRIST

## 2021-04-03 PROCEDURE — 0YPB3YZ REMOVAL OF OTHER DEVICE FROM LEFT LOWER EXTREMITY, PERCUTANEOUS APPROACH: ICD-10-PCS | Performed by: PODIATRIST

## 2021-04-03 PROCEDURE — 99223 1ST HOSP IP/OBS HIGH 75: CPT | Performed by: INTERNAL MEDICINE

## 2021-04-03 PROCEDURE — 93010 ELECTROCARDIOGRAM REPORT: CPT | Performed by: INTERNAL MEDICINE

## 2021-04-03 PROCEDURE — 73630 X-RAY EXAM OF FOOT: CPT

## 2021-04-03 PROCEDURE — 99222 1ST HOSP IP/OBS MODERATE 55: CPT | Performed by: INTERNAL MEDICINE

## 2021-04-03 PROCEDURE — C1713 ANCHOR/SCREW BN/BN,TIS/BN: HCPCS | Performed by: PODIATRIST

## 2021-04-03 PROCEDURE — 88311 DECALCIFY TISSUE: CPT | Performed by: PATHOLOGY

## 2021-04-03 PROCEDURE — 82948 REAGENT STRIP/BLOOD GLUCOSE: CPT

## 2021-04-03 DEVICE — INJECTABLE HA BONE CEMENT
Type: IMPLANTABLE DEVICE | Site: FOOT | Status: FUNCTIONAL
Brand: HYDROSET XT

## 2021-04-03 RX ORDER — FENTANYL CITRATE/PF 50 MCG/ML
25 SYRINGE (ML) INJECTION
Status: DISCONTINUED | OUTPATIENT
Start: 2021-04-03 | End: 2021-04-03 | Stop reason: HOSPADM

## 2021-04-03 RX ORDER — ONDANSETRON 2 MG/ML
INJECTION INTRAMUSCULAR; INTRAVENOUS AS NEEDED
Status: DISCONTINUED | OUTPATIENT
Start: 2021-04-03 | End: 2021-04-03

## 2021-04-03 RX ORDER — VANCOMYCIN HYDROCHLORIDE 1 G/20ML
INJECTION, POWDER, LYOPHILIZED, FOR SOLUTION INTRAVENOUS AS NEEDED
Status: DISCONTINUED | OUTPATIENT
Start: 2021-04-03 | End: 2021-04-03 | Stop reason: HOSPADM

## 2021-04-03 RX ORDER — ACETAMINOPHEN 325 MG/1
650 TABLET ORAL EVERY 6 HOURS PRN
Status: DISCONTINUED | OUTPATIENT
Start: 2021-04-03 | End: 2021-04-08 | Stop reason: HOSPADM

## 2021-04-03 RX ORDER — PROPOFOL 10 MG/ML
INJECTION, EMULSION INTRAVENOUS AS NEEDED
Status: DISCONTINUED | OUTPATIENT
Start: 2021-04-03 | End: 2021-04-03

## 2021-04-03 RX ORDER — EPHEDRINE SULFATE 50 MG/ML
INJECTION INTRAVENOUS AS NEEDED
Status: DISCONTINUED | OUTPATIENT
Start: 2021-04-03 | End: 2021-04-03

## 2021-04-03 RX ORDER — ONDANSETRON 2 MG/ML
4 INJECTION INTRAMUSCULAR; INTRAVENOUS ONCE AS NEEDED
Status: DISCONTINUED | OUTPATIENT
Start: 2021-04-03 | End: 2021-04-03 | Stop reason: HOSPADM

## 2021-04-03 RX ORDER — MIDAZOLAM HYDROCHLORIDE 2 MG/2ML
INJECTION, SOLUTION INTRAMUSCULAR; INTRAVENOUS AS NEEDED
Status: DISCONTINUED | OUTPATIENT
Start: 2021-04-03 | End: 2021-04-03

## 2021-04-03 RX ORDER — FENTANYL CITRATE 50 UG/ML
INJECTION, SOLUTION INTRAMUSCULAR; INTRAVENOUS AS NEEDED
Status: DISCONTINUED | OUTPATIENT
Start: 2021-04-03 | End: 2021-04-03

## 2021-04-03 RX ORDER — MAGNESIUM HYDROXIDE 1200 MG/15ML
LIQUID ORAL AS NEEDED
Status: DISCONTINUED | OUTPATIENT
Start: 2021-04-03 | End: 2021-04-03 | Stop reason: HOSPADM

## 2021-04-03 RX ORDER — TOBRAMYCIN 1.2 G/30ML
INJECTION, POWDER, LYOPHILIZED, FOR SOLUTION INTRAVENOUS AS NEEDED
Status: DISCONTINUED | OUTPATIENT
Start: 2021-04-03 | End: 2021-04-03 | Stop reason: HOSPADM

## 2021-04-03 RX ADMIN — FENTANYL CITRATE 25 MCG: 50 INJECTION, SOLUTION INTRAMUSCULAR; INTRAVENOUS at 10:40

## 2021-04-03 RX ADMIN — PROPOFOL 200 MG: 10 INJECTION, EMULSION INTRAVENOUS at 10:37

## 2021-04-03 RX ADMIN — METRONIDAZOLE 500 MG: 500 TABLET ORAL at 16:37

## 2021-04-03 RX ADMIN — METRONIDAZOLE 500 MG: 500 TABLET ORAL at 08:11

## 2021-04-03 RX ADMIN — SODIUM CHLORIDE: 0.9 INJECTION, SOLUTION INTRAVENOUS at 11:17

## 2021-04-03 RX ADMIN — CEFAZOLIN SODIUM 2000 MG: 2 SOLUTION INTRAVENOUS at 17:00

## 2021-04-03 RX ADMIN — INSULIN LISPRO 2 UNITS: 100 INJECTION, SOLUTION INTRAVENOUS; SUBCUTANEOUS at 22:46

## 2021-04-03 RX ADMIN — CEFAZOLIN SODIUM 2000 MG: 2 SOLUTION INTRAVENOUS at 08:06

## 2021-04-03 RX ADMIN — ACETAMINOPHEN 650 MG: 325 TABLET, FILM COATED ORAL at 22:43

## 2021-04-03 RX ADMIN — AMLODIPINE BESYLATE 5 MG: 5 TABLET ORAL at 08:06

## 2021-04-03 RX ADMIN — MIDAZOLAM 2 MG: 1 INJECTION INTRAMUSCULAR; INTRAVENOUS at 10:27

## 2021-04-03 RX ADMIN — INSULIN GLARGINE 18 UNITS: 100 INJECTION, SOLUTION SUBCUTANEOUS at 22:46

## 2021-04-03 RX ADMIN — PHENYLEPHRINE HYDROCHLORIDE 50 MCG: 10 INJECTION INTRAVENOUS at 11:10

## 2021-04-03 RX ADMIN — PHENYLEPHRINE HYDROCHLORIDE 50 MCG: 10 INJECTION INTRAVENOUS at 10:58

## 2021-04-03 RX ADMIN — EPHEDRINE SULFATE 10 MG: 50 INJECTION, SOLUTION INTRAVENOUS at 11:17

## 2021-04-03 RX ADMIN — METRONIDAZOLE 500 MG: 500 TABLET ORAL at 00:13

## 2021-04-03 RX ADMIN — ONDANSETRON 4 MG: 2 INJECTION INTRAMUSCULAR; INTRAVENOUS at 11:56

## 2021-04-03 RX ADMIN — ACETAMINOPHEN 650 MG: 325 TABLET, FILM COATED ORAL at 14:53

## 2021-04-03 RX ADMIN — CEFAZOLIN SODIUM 2000 MG: 2 SOLUTION INTRAVENOUS at 00:15

## 2021-04-03 RX ADMIN — LIDOCAINE HYDROCHLORIDE 100 MG: 20 INJECTION, SOLUTION INTRAVENOUS at 10:37

## 2021-04-03 RX ADMIN — FENTANYL CITRATE 25 MCG: 50 INJECTION, SOLUTION INTRAMUSCULAR; INTRAVENOUS at 11:04

## 2021-04-03 RX ADMIN — FENTANYL CITRATE 25 MCG: 50 INJECTION, SOLUTION INTRAMUSCULAR; INTRAVENOUS at 10:33

## 2021-04-03 RX ADMIN — FENTANYL CITRATE 25 MCG: 50 INJECTION, SOLUTION INTRAMUSCULAR; INTRAVENOUS at 11:15

## 2021-04-03 RX ADMIN — SODIUM CHLORIDE 75 ML/HR: 0.9 INJECTION, SOLUTION INTRAVENOUS at 00:16

## 2021-04-03 NOTE — OP NOTE
OPERATIVE REPORT - Podiatry  PATIENT NAME: Heidy Lomax    :  1960  MRN: [id number]  Pt [facility]    SURGERY DATE: 4/3/2021    Surgeon(s) and Role: * Madie Alexander DPM - [address] [address], [place] - Assisting    Pre-op Diagnosis:  Charcot's joint of left foot [M14 672]  Diabetic foot infection ([address] ) [E11 628, L08 9]    Post-Op Diagnosis Codes:     * Charcot's joint of left foot [M14 672]     * Diabetic foot infection ([address] ) [E11 628, L08 9]    Procedure(s) (LRB):  INCISION AND DRAINAGE (I&D) FOOT; Removal of hardware; Removal of infected nonviable deformed bone and soft tissue with fifth metatarsal partial resection (Left)   Saucerization of cuboid    Specimen(s):  ID Type Source Tests Collected by Time Destination   1 : LEFT 5th METATARSAL BASE Tissue Foot, Left TISSUE EXAM Madie Alexander DPM 4/3/2021 1133    2 : LEFT 5th METATARSAL BASE clean margin Tissue Foot, Left TISSUE EXAM Madie Alexander DPM 4/3/2021 1136    3 : Distal cuboid Tissue Foot, Left TISSUE EXAM Madie Alexander DPM 4/3/2021 1141        Estimated Blood Loss:   Minimal    Drains:  * No LDAs found *    Anesthesia Type:   Choice     Hemostasis:  Surgical dissection  Pneumatic thigh tourniquet placed for 60 minutes    Materials:  Implant Name Type Inv  Item Serial No   Lot No  LRB No  Used Action   NAIL IM T2 ICF 8 X 80MM - [id number]  NAIL IM T2 ICF 8 X 80MM  BRUNA [employer] [id number] Left 1 Explanted   SCREW 7 X 105MM HDLS FIXOS - [id number]  SCREW 7 X 105MM HDLS FIXOS  BRUNA DEENA  Left 1 Explanted   SCREW 3 5 X 34MM T10 FLLY TRHD - [id number]  SCREW 3 5 X 34MM T10 FLLY TRHD  BRUNA [employer]  Left 1 Explanted   SCREW 3 5 X 22MM T10 FLLY TRHD - [id number]  SCREW 3 5 X 22MM T10 FLLY TRHD  BRUNA [employer]  Left 1 Explanted   CEMENT BONE SUB 15ML HYDROSET XT - [id number]  CEMENT BONE SUB 15ML HYDROSET XT  [place] [id number] Left 1 Implanted     Hydroset mixed with vancomycin and tobramycin       Operative Findings:  Viable bleeding tissue, hypertrophic 5th metatarsal base, no purulence noted  No necrotic tissue noted after excision of wound  Complications:   None    Procedure and Technique:     Under mild sedation, the patient was brought into the operating room and placed on the operating room table in the supine position  A pneumatic tourniquet was then placed around the patient's left lower extremity with ample webril padding  A time out was performed to confirm the correct patient, procedure and site with all parties in agreement  The foot was then scrubbed, prepped and draped in the usual aseptic manner  Left lower extremity was elevated for 2 min and the tourniquet was then inflated  The foot was placed on the operating room table  Patient was then directed to the left lower extremity  Upon C-arm elevaluation hardware were located to posterior aspect of the left calcaneus extending across the calcaneal cuboid joint  and  the screws noted to lateral aspect of the rearfoot previously used to hold the nail in place were identified  Three incisions were made in ordered to access and remove the hardware  All hardware (x4) were removed intact without any incident  Attention was then directed to the wound noted to lateral aspect of the foot at the level of the 5th metatarsal base  Utilizing a 15 blade wound was excised  Incision was deep to bone  Fifth metatarsal base was found to be prominent and hypertrophic  Due to chronic wound and its proximity to base of 5th metatarsal, the base of the 5th metatarsal was removed and sent to pathology  Clean margin was taken from the distal aspect of the 5th metatarsal and sent to pathology   Saucerization of distal aspect of cuboid was performed and bone was sent to pathology  The surgical incision was irrigated with copious amounts of normal sterile saline  Hydrocet was mixed with tobramycin and vancomycin   It was then applied to bone where hardware was removed  As well as remaining distal aspect of the 5th metatarsal and distal aspect of cuboid  Skin edges were reapproximated and closure was obtained utilizing interrupted retention sutures utilizing 2-0 and 3-0  Nylon  The foot was then cleansed and dried  The incision site was dressed with Xeroform, 4x4 gauze, and ABD  This was then covered with a Kerlix and an ACE wrap  The tourniquet was deflated and normal hyperemic flush was noted to all digits  The patient tolerated the procedure and anesthesia well and was transported to the PACU with vital signs stable  Dr Abdullahi Contreras was present during the entire procedure and participated in all key aspects  SIGNATURE: Kiley Desai [place]  DATE: April 3, 2021  TIME: 12:23 PM      Portions of the record may have been created with voice recognition software  Occasional wrong word or "sound a like" substitutions may have occurred due to the inherent limitations of voice recognition software  Read the chart carefully and recognize, using context, where substitutions have occurred

## 2021-04-03 NOTE — UTILIZATION REVIEW
Notification of Inpatient Admission/Inpatient Authorization Request   This is a Notification of Inpatient Admission for [address]  Be advised that this patient was admitted to our facility under Inpatient Status  Contact Shavon Sandoval at [phone] for additional admission information  [facility] DEPT  DEDICATED [phone]  Patient Name:   Cecelia Nicole   YOB: 1960       [address]   [address]:   [facility]  Tax ID: [id number]  NPI: [id number] Attending Provider/NPI:  Phone:  Address: Patrici Mortimer [[id number]]  [phone]  Same as the facility   Place of Service Code: 24 Place of Service Name:  [address] [place] [address]   Start Date: 4/2/21 1448 Discharge Date & Time: No discharge date for patient encounter  Type of Admission: Inpatient Status Discharge Disposition   (if discharged): Home/Self Care   Patient Diagnoses: Cellulitis of foot, left [L03 116]     Orders: Admission Orders (From admission, onward)     Ordered        04/02/21 1646  Inpatient Admission  Once                    Assigned Utilization Review Contact: Emeli Escalera  Utilization   Network Utilization Review Department  Phone: [phone]; Fax [phone]  Email: [email or web address]   ATTENTION PAYERS: Please call the assigned Utilization  directly with any questions or concerns ALL voicemails in the department are confidential  Send all requests for admission clinical reviews, approved or denied determinations and any other requests to dedicated fax number belonging to the campus where the patient is receiving treatment

## 2021-04-03 NOTE — PLAN OF CARE
Problem: Potential for Falls  Goal: Patient will remain free of falls  Description: INTERVENTIONS:  - Assess patient frequently for physical needs  -  Identify cognitive and physical deficits and behaviors that affect risk of falls  -  [place] fall precautions as indicated by assessment   - Educate patient/family on patient safety including physical limitations  - Instruct patient to call for assistance with activity based on assessment  - Modify environment to reduce risk of injury  - Consider OT/PT consult to assist with strengthening/mobility  Outcome: Progressing     Problem: METABOLIC, FLUID AND ELECTROLYTES - ADULT  Goal: Glucose maintained within target range  Description: INTERVENTIONS:  - Monitor Blood Glucose as ordered  - Assess for signs and symptoms of hyperglycemia and hypoglycemia  - Administer ordered medications to maintain glucose within target range  - Assess nutritional intake and initiate nutrition service referral as needed  Outcome: Progressing     Problem: PAIN - ADULT  Goal: Verbalizes/displays adequate comfort level or baseline comfort level  Description: Interventions:  - Encourage patient to monitor pain and request assistance  - Assess pain using appropriate pain scale  - Administer analgesics based on type and severity of pain and evaluate response  - Implement non-pharmacological measures as appropriate and evaluate response  - Consider cultural and social influences on pain and pain management  - Notify physician/advanced practitioner if interventions unsuccessful or patient reports new pain  Outcome: Progressing     Problem: SAFETY ADULT  Goal: Patient will remain free of falls  Description: INTERVENTIONS:  - Assess patient frequently for physical needs  -  Identify cognitive and physical deficits and behaviors that affect risk of falls    -  [place] fall precautions as indicated by assessment   - Educate patient/family on patient safety including physical limitations  - Instruct patient to call for assistance with activity based on assessment  - Modify environment to reduce risk of injury  - Consider OT/PT consult to assist with strengthening/mobility  Outcome: Progressing  Goal: Maintain or return to baseline ADL function  Description: INTERVENTIONS:  -  Assess patient's ability to carry out ADLs; assess patient's baseline for ADL function and identify physical deficits which impact ability to perform ADLs (bathing, care of mouth/teeth, toileting, grooming, dressing, etc )  - Assess/evaluate cause of self-care deficits   - Assess range of motion  - Assess patient's mobility; develop plan if impaired  - Assess patient's need for assistive devices and provide as appropriate  - Encourage maximum independence but intervene and supervise when necessary  - Involve family in performance of ADLs  - Assess for home care needs following discharge   - Consider OT consult to assist with ADL evaluation and planning for discharge  - Provide patient education as appropriate  Outcome: Progressing  Goal: Maintain or return mobility status to optimal level  Description: INTERVENTIONS:  - Assess patient's baseline mobility status (ambulation, transfers, stairs, etc )    - Identify cognitive and physical deficits and behaviors that affect mobility  - Identify mobility aids required to assist with transfers and/or ambulation (gait belt, sit-to-stand, lift, walker, cane, etc )  - [place] fall precautions as indicated by assessment  - Record patient progress and toleration of activity level on Mobility SBAR; progress patient to next Phase/Stage  - Instruct patient to call for assistance with activity based on assessment  - Consider rehabilitation consult to assist with strengthening/weightbearing, etc   Outcome: Progressing     Problem: DISCHARGE PLANNING  Goal: Discharge to home or other facility with appropriate resources  Description: INTERVENTIONS:  - Identify barriers to discharge w/patient and caregiver  - Arrange for needed discharge resources and transportation as appropriate  - Identify discharge learning needs (meds, wound care, etc )  - Arrange for interpretive services to assist at discharge as needed  - Refer to Case Management Department for coordinating discharge planning if the patient needs post-hospital services based on physician/advanced practitioner order or complex needs related to functional status, cognitive ability, or social support system  Outcome: Progressing     Problem: Knowledge Deficit  Goal: Patient/family/caregiver demonstrates understanding of disease process, treatment plan, medications, and discharge instructions  Description: Complete learning assessment and assess knowledge base    Interventions:  - Provide teaching at level of understanding  - Provide teaching via preferred learning methods  Outcome: Progressing

## 2021-04-03 NOTE — CONSULTS
Consultation - Infectious Disease   Ardia Canavan 64 y o  female MRN: [id number]  Unit/Bed#: E2 -01 Encounter: [id number]      IMPRESSION & RECOMMENDATIONS:   1  Infected left foot ulceration-in the setting of hardware in place with progressive lucency around the hardware suggesting the possibility of hardware infection with osteomyelitis  The patient has undergone removal of the lateral hardware although there is some hardware that remains in the mid to medial foot  Unfortunately no operative cultures were obtained   -continue cefazolin Flagyl for now  -recheck CBC with diff and BMP  -check sedimentation rate and CRP  -podiatry will assess whether office cultures are available although this certainly would not be as helpful as deep operative cultures  -follow up pathology and if remaining osteomyelitis at the margins, additional surgical intervention will be indicated  -close podiatry follow-up  -local wound care    2  Left foot cellulitis-in the setting of previous reconstruction of the Charcot foot  Status postoperative intervention as above   -antibiotics as above  -serial exams    3  Diabetes mellitus-type 2  With long-term insulin use  With reasonable glucose control with a hemoglobin A1c of 5 8    Have discussed the above management plan in detail with the Podiatry service and an internal medicine service    Extensive review of the medical records in epic including review of the notes, radiographs, and laboratory results     HISTORY OF PRESENT ILLNESS:  Reason for Consult:  Diabetic foot infection with hardware  HPI: Ardia Canavan is a 64y o  year old female with diabetes mellitus, Charcot foot, admitted to [facility] with worsening left foot swelling and redness and drainage who I am asked to assist with antibiotic management  The patient has had a history of Charcot foot and had undergone reconstructive surgery in a staged approach over the past year    She initially had external fixator placed in hardware in place however she had some of that hardware revised and had the external fixator removed and her last surgery was back in 2020  She had been doing reasonably well since that time until approximately 1 week ago when she began noticing some itching involving the lateral aspect of her left foot  She initially did not have any drainage but had a dry opening that had developed  She subsequently developed some bloody drainage and eventually went to see Podiatry for further evaluation  On further evaluation there was a concern about possible infection and therefore the patient was admitted yesterday for further management  She was started on cefazolin Flagyl and today went to the OR and underwent removal of hardware involving the 5th metatarsal   Apparently radiographic images have shown some increased lucency in the region  Unfortunately no operative cultures were obtained  The patient denies any fever chills or sweats, denies any nausea vomiting or diarrhea, denies any cough or shortness of breath, denies any dysuria or hematuria  She does have some postoperative pain  REVIEW OF SYSTEMS:  A complete review of systems is negative other than that noted in the HPI      PAST MEDICAL HISTORY:  Past Medical History:   Diagnosis Date    Anesthesia complication     difficulty awakening    Arthritis     right hand middle finger    Cancer ([address] )     uterine- radical  hysterectomy/ chemo and radiation    Charcot's joint, left ankle and foot     Colon polyp     Diabetes mellitus ([address] )     IDDM    Hypertension     Ulcer of great toe ([address] )     right- pt changes dsg daily    Unable to bear weight     LLE- using knee scooter     Past Surgical History:   Procedure Laterality Date     SECTION      x 3    COLONOSCOPY      FOOT FUSION Left 10/15/2020    Procedure: CHARCOT RECONSTRUCTION WITH MIDFOOT, MEDIAL COLUMN FUSION; INTERNAL, achiles lengthening; Surgeon: Claudia Martínez DPM;  [facility];  Service: Podiatry    HARDWARE REMOVAL N/A 2020    Procedure: LEFT FOOT FRAME REMOVAL;  Surgeon: Claudia Martínez DPM;  [facility];  Service: Podiatry    HYSTERECTOMY      JOINT REPLACEMENT Right     TKR    [place] APPLY BONE UNIPLANE,EXT FIX DEV Left 2020    Procedure: EX-FIX APPLICATION;  Surgeon: Claudia Martínez DPM;  [facility];  Service: Podiatry    [place] ELECT BONE STIM NONINVASIVE Left 2020    Procedure: BONE STIM APPLICATION & activation;  Surgeon: Claudia Martínez DPM;  [facility];  Service: Podiatry    W[place] GASTROCNEMIUS RECESSION Left 2020    Procedure: ENDO GASTROC RECESSION;  Surgeon: Claudia Martínez DPM;  [facility];  Service: Podiatry    WOUND DEBRIDEMENT Bilateral 2020    Procedure: DEBRIDE ULCER W/GRAFT APPS;  Surgeon: Claudia Martínez DPM;  [facility];  Service: Podiatry       FAMILY HISTORY:  Non-contributory    SOCIAL HISTORY:  Social History   Social History     Substance and Sexual Activity   Alcohol Use Yes    Frequency: Monthly or less    Drinks per session: 1 or 2    Comment: rarely     Social History     Substance and Sexual Activity   Drug Use Never     Social History     Tobacco Use   Smoking Status Never Smoker   Smokeless Tobacco Never Used       ALLERGIES:  No Known Allergies    MEDICATIONS:  All current active medications have been reviewed    Antibiotics:  Cefazolin Flagyl 2, postop day 0    PHYSICAL EXAM:  Temp:  [98 3 °F (36 8 °C)-99 3 °F (37 4 °C)] 98 6 °F (37 °C)  HR:  [70-93] 70  Resp:  [16-20] 18  BP: (116-179)/(60-94) 136/69  SpO2:  [94 %-99 %] 96 %  Temp (24hrs), Av 8 °F (37 1 °C), Min:98 3 °F (36 8 °C), Max:99 3 °F (37 4 °C)  Current: Temperature: 98 6 °F (37 °C)    Intake/Output Summary (Last 24 hours) at 4/3/2021 1439  Last data filed at 4/3/2021 1245  Gross per 24 hour   Intake 850 ml   Output 30 ml   Net 820 ml       General Appearance: Appearing well, nontoxic, and in no distress   Head:  Normocephalic, without obvious abnormality, atraumatic   Eyes:  Conjunctiva pink and sclera anicteric, both eyes   Nose: Nares normal, mucosa normal, no drainage   Throat: Oropharynx moist without lesions   Neck: Supple, symmetrical, no adenopathy, no tenderness/mass/nodules   Back:   Symmetric, no curvature, ROM normal, no CVA tenderness   Lungs:   Clear to auscultation bilaterally, respirations unlabored   Chest Wall:  No tenderness or deformity   Heart:  RRR; no murmur, rub or gallop   Abdomen:   Soft, non-tender, non-distended, positive bowel sounds    Extremities: No cyanosis, clubbing  Left foot deformity with a dry dressing in place  Skin: No rashes or lesions  No draining wounds noted  Lymph nodes: Cervical, supraclavicular nodes normal   Neurologic: Alert and oriented times 3, extremity strength 5/5 and symmetric       LABS, IMAGING, & OTHER STUDIES:  Lab Results:  I have personally reviewed pertinent labs    Results from last 7 days   Lab Units 04/02/21  1705 04/02/21  0833   WBC Thousand/uL  --  5 66   HEMOGLOBIN g/dL  --  11 6   PLATELETS Thousands/uL 287 310     Results from last 7 days   Lab Units 04/02/21  0833   SODIUM mmol/L 142   POTASSIUM mmol/L 4 8   CHLORIDE mmol/L 111*   CO2 mmol/L 29   BUN mg/dL 21   CREATININE mg/dL 0 85   EGFR ml/min/1 73sq m 76   CALCIUM mg/dL 9 6                           Imaging Studies:     Chest x-ray-no acute cardiopulmonary disease    X-ray left foot-progressive lucency surrounding distal screw with the base of the 5th metatarsal   Worsening soft tissue swelling    Images personally reviewed by me in PACS

## 2021-04-03 NOTE — UTILIZATION REVIEW
Initial Clinical Review    Admission: Date/Time/Statement:   Admission Orders (From admission, onward)     Ordered        04/02/21 1646  Inpatient Admission  Once                   Orders Placed This Encounter   Procedures    Inpatient Admission     Standing Status:   Standing     Number of Occurrences:   1     Order Specific Question:   Level of Care     Answer:   Med Surg [16]     Order Specific Question:   Estimated length of stay     Answer:   More than 2 Midnights     Order Specific Question:   Certification     Answer:   I certify that inpatient services are medically necessary for this patient for a duration of greater than two midnights  See H&P and MD Progress Notes for additional information about the patient's course of treatment  No chief complaint on file  Assessment/Plan: 65 yo female  patient presented to [facility] as a direct inpatient admission from the [place] [address] office for diabetic foot ulcer  Left foot ulceration holman 3 Patient has extensive history of left foot Charcot neuro arthropathy with multiple reconstructive procedures being undergone last year  She states that approximately 1 week ago 03/27/2021 she began noticing itching sensation of left lateral foot  After scratching this next day 3/28 her  noticed that there was an opening on her left foot  She began to notice blood draining from the wound and redness surrounding the new ulcer     04-03-21     Pre-op Diagnosis:  Charcot's joint of left foot [M14 672]  Diabetic foot infection ([facility] ) [A36 635, L08 9]     Post-Op Diagnosis Codes:     * Charcot's joint of left foot [M14 672]     * Diabetic foot infection ([facility] ) [E11 628, L08 9]     Procedure(s) (LRB):  INCISION AND DRAINAGE (I&D) FOOT; Removal of hardware; Removal of infected nonviable deformed bone and soft tissue with fifth metatarsal partial resection (Left)  General  Operative Findings:  Viable bleeding tissue, hypertrophic 5th metatarsal base        ADMITTING  Vitals Temperature Pulse Respirations Blood Pressure SpO2   04/02/21 1449 04/02/21 1449 04/02/21 1449 04/02/21 1449 04/02/21 1449   99 3 °F (37 4 °C) 93 18 132/78 99 %      Temp Source Heart Rate Source Patient Position - Orthostatic VS BP Location FiO2 (%)   04/02/21 1449 04/03/21 0840 04/02/21 1449 04/02/21 1449 --   Temporal Monitor Lying Right arm       Pain Score       04/02/21 1449       No Pain          Wt Readings from Last 1 Encounters:   10/14/20 67 6 kg (149 lb)     Additional Vital Signs:   Date/Time  Temp  Pulse  Resp  BP  SpO2  O2 Device  Cardiac (WDL)  Patient Position - Orthostatic VS   04/03/21 1322  98 6 °F (37 °C)  78  18  140/71  97 %  None (Room air)  --  Lying   04/03/21 1247  98 8 °F (37 1 °C)  78  16  147/71  96 %  None (Room air)  --  --   04/03/21 1230  --  80  16  136/70  97 %  None (Room air)  --  --   04/03/21 1215  98 3 °F (36 8 °C)  75  16  133/65  95 %  None (Room air)  WDL  --   04/03/21 0840  --  80  --  176/60Abnormal   97 %  None (Room air)  --  Sitting   04/03/21 0700  98 9 °F (37 2 °C)  79  20  179/94Abnormal   98 %  None (Room air)  --  Lying   04/02/21 2222  98 7 °F (37 1 °C)  72  20  116/66  97 %  None (Room air)  --  Lying       Pertinent Labs/Diagnostic Test Results:       Results from last 7 days   Lab Units 04/02/21  1705 04/02/21  0833   WBC Thousand/uL  --  5 66   HEMOGLOBIN g/dL  --  11 6   HEMATOCRIT %  --  36 0   PLATELETS Thousands/uL 287 310   NEUTROS ABS Thousands/µL  --  3 75         Results from last 7 days   Lab Units 04/02/21  0833   SODIUM mmol/L 142   POTASSIUM mmol/L 4 8   CHLORIDE mmol/L 111*   CO2 mmol/L 29   ANION GAP mmol/L 2*   BUN mg/dL 21   CREATININE mg/dL 0 85   EGFR ml/min/1 73sq m 76   CALCIUM mg/dL 9 6         Results from last 7 days   Lab Units 04/03/21  1235 04/03/21  0611 04/02/21  2042 04/02/21  1630   POC GLUCOSE mg/dl 93 94 169* 143*     Results from last 7 days   Lab Units 04/02/21  0833   GLUCOSE RANDOM mg/dL 101       Results from last 7 days   Lab Units 04/02/21  0833   SED RATE mm/hour 37*     Left foot XR 04-03-21  1   Progressive lucency surrounding the distal screw at the base of the 5th metatarsal representing either infection or loosening  2   Worsening soft tissue swelling diffusely surrounding the foot, most compatible with cellulitis  CXR 04-03-21  NAD       Past Medical History:   Diagnosis Date    Anesthesia complication     difficulty awakening    Arthritis     right hand middle finger    Cancer (HCC)     uterine- radical  hysterectomy/ chemo and radiation    Charcot's joint, left ankle and foot     Colon polyp     Diabetes mellitus (HCC)     IDDM    Hypertension     Ulcer of great toe (HCC)     right- pt changes dsg daily    Unable to bear weight     LLE- using knee scooter     Present on Admission:   Diabetes mellitus, type 2 ([facility] )   HTN (hypertension)      Admitting Diagnosis: Cellulitis of foot, left [L03 116]  Age/Sex: 64 y o  female  Admission Orders:  Scheduled Medications:  [MAR Hold] amLODIPine, 5 mg, Oral, Daily  [MAR Hold] ascorbic acid, 250 mg, Oral, Daily  [MAR Hold] aspirin, 81 mg, Oral, Daily  [MAR Hold] cefazolin, 2,000 mg, Intravenous, Q8H  [MAR Hold] enoxaparin, 40 mg, Subcutaneous, Daily  [MAR Hold] insulin glargine, 18 Units, Subcutaneous, HS  [MAR Hold] insulin lispro, 1-5 Units, Subcutaneous, TID AC  [MAR Hold] insulin lispro, 1-5 Units, Subcutaneous, HS  [MAR Hold] losartan, 25 mg, Oral, Daily  [MAR Hold] metroNIDAZOLE, 500 mg, Oral, Q8H [address]      Continuous IV Infusions:  sodium chloride, 75 mL/hr, Intravenous, Continuous      PRN Meds:       IP CONSULT TO INFECTIOUS DISEASES  IP CONSULT TO INTERNAL MEDICINE   Blood sugars [facility]      Network Utilization Review Department  ATTENTION: Please call with any questions or concerns to [phone] and carefully listen to the prompts so that you are directed to the right person   All voicemails are confidential   Harlo Large all requests for admission clinical reviews, approved or denied determinations and any other requests to dedicated fax number below belonging to the campus where the patient is receiving treatment   List of dedicated fax numbers for the Facilities:  [address] [place] [address] DENIALS (Administrative/Medical Necessity) [phone]   [address] (Maternity/NICU/Pediatrics) [phone] [address] [place] [address] Dr Teri Wahl 0767 (  [facility]) [address] [place] [address] [address] [place] [address] [place] [address] [phone]

## 2021-04-03 NOTE — PROGRESS NOTES
Podiatry - Progress Note  Patient: Ethel Andrea 64 y o  female   MRN: [id number]  PCP: Camila Lugo DO  Unit/Bed#: E2 -01 Encounter: [id number]  Date Of Visit: 21    ASSESSMENT:    Ethel Andrea is a 64 y o  female with:    1  Left foot ulceration holman 3  2  Left foot cellulitis  3  Left foot charcot neuroarthropathy s/p reconstruction  4  Diabetes Type 2, A1c 5 8% 2020      PLAN:    · Patient to go to OR today,21, for  Left foot  I&D and hardware removal with Dr Tameka Morocho  · Consent placed in chart  To be signed with surgery prior to procedure  · Confirmed NPO status  · H&P, vitals, and current labs reviewed  No acute changes noted  · Alternatives, risks, and complications discussed with patient  · All questions answered  No guarantees given tap, procedure  · Rest of medical care per primary team        SUBJECTIVE:     The patient was seen, evaluated, and assessed at bedside today  The patient was awake, alert, and in no acute distress  Patient confirmed NPO status  All questions and concerns regarding the surgical procedure addressed  Patient understands risks vs benefits of procedure and remains amenable with plan for surgery today  Patient denies N/V/F/chills/SOB/CP  OBJECTIVE:     Vitals:   /66   Pulse 72   Temp 98 7 °F (37 1 °C) (Temporal)   Resp 20   SpO2 97%     Temp (24hrs), Av °F (37 2 °C), Min:98 7 °F (37 1 °C), Max:99 3 °F (37 4 °C)      Physical Exam:     General:  Alert, cooperative, and in no distress  Lower extremity exam:  Cardiovascular status at baseline  Neurological status at baseline  Musculoskeletal status at baseline  No calf tenderness noted bilaterally  Dressing left intact to the Operating Room         Additional Data:     Labs:    Results from last 7 days   Lab Units 21  1705 21  0833   WBC Thousand/uL  --  5 66   HEMOGLOBIN g/dL  --  11 6   HEMATOCRIT %  --  36 0   PLATELETS Thousands/uL 287 310   NEUTROS PCT %  -- 66   LYMPHS PCT %  --  22   MONOS PCT %  --  7   EOS PCT %  --  4     Results from last 7 days   Lab Units 04/02/21  0833   POTASSIUM mmol/L 4 8   CHLORIDE mmol/L 111*   CO2 mmol/L 29   BUN mg/dL 21   CREATININE mg/dL 0 85   CALCIUM mg/dL 9 6           * I Have Reviewed All Lab Data Listed Above  Recent Cultures (last 7 days):               Imaging: I have personally reviewed pertinent films in PACS  EKG, Pathology, and Other Studies: I have personally reviewed pertinent reports  ** Please Note: Portions of the record may have been created with voice recognition software  Occasional wrong word or "sound a like" substitutions may have occurred due to the inherent limitations of voice recognition software  Read the chart carefully and recognize, using context, where substitutions have occurred   **

## 2021-04-03 NOTE — CONSULTS
Consultation - Fran Briceno 64 y o  female MRN: [id number]    Unit/Bed#: E2 -01 Encounter: [id number]      No chief complaint on file  Assessment/Plan     Left foot cellulitis/ulceration  history of fracture with hardware, now requiring removal and evaluation, surgery planned for today    Diabetes    well controlled recent hemoglobin A1c 5 8, continue sliding scale with Lantus    Hypertension    controlled with Norvasc and losartan      Patient Active Problem List   Diagnosis    Delayed emergence from anesthesia    HTN (hypertension)    Uterine cancer ([facility] )    [facility]    Charcot's joint of left foot    Diabetic foot infection ([facility] )         History of Present Illness     HPI: Fran Briceno is a 64y o  year old female who presents with for hardware removal of left foot  Reports history of a fracture and Charcot's foot, hardware placed now displaced and needing removal   Denies chest pain or shortness of breath with activity  Consults    Review of Systems   Constitutional: Negative  HENT: Negative  Eyes: Negative  Respiratory: Negative  Cardiovascular: Negative  Gastrointestinal: Negative  Genitourinary: Negative  Musculoskeletal: Negative  Neurological: Negative  Psychiatric/Behavioral: Negative          Historical Information   Past Medical History:   Diagnosis Date    Anesthesia complication     difficulty awakening    Arthritis     right hand middle finger    Cancer ([facility] )     uterine- radical  hysterectomy/ chemo and radiation    Charcot's joint, left ankle and foot     Colon polyp     [facility]     IDDM    Hypertension     Ulcer of great toe ([facility] )     right- pt changes dsg daily    Unable to bear weight     LLE- using knee scooter     Past Surgical History:   Procedure Laterality Date     SECTION      x 3    COLONOSCOPY      FOOT FUSION Left 10/15/2020    Procedure: CHARCOT RECONSTRUCTION WITH MIDFOOT, MEDIAL COLUMN FUSION; INTERNAL, achiles lengthening;  Surgeon: Jaleel Carter DPM;  [facility];  Service: Podiatry    HARDWARE REMOVAL N/A 9/23/2020    Procedure: LEFT FOOT FRAME REMOVAL;  Surgeon: Jaleel Carter DPM;  [facility];  Service: Podiatry    HYSTERECTOMY      JOINT REPLACEMENT Right     TKR    [place] APPLY BONE UNIPLANE,EXT FIX DEV Left 7/30/2020    Procedure: EX-FIX APPLICATION;  Surgeon: Jaleel Carter DPM;  [facility];  Service: Podiatry    [place] ELECT BONE STIM NONINVASIVE Left 7/30/2020    Procedure: BONE STIM APPLICATION & activation;  Surgeon: Jaleel Carter DPM;  [facility];  Service: Podiatry    [place] GASTROCNEMIUS RECESSION Left 7/30/2020    Procedure: ENDO GASTROC RECESSION;  Surgeon: Jaleel Carter DPM;  [facility];  Service: Podiatry    WOUND DEBRIDEMENT Bilateral 7/30/2020    Procedure: DEBRIDE ULCER W/GRAFT APPS;  Surgeon: Jaleel Carter DPM;  [facility];  Service: Podiatry     Social History   Social History     Substance and Sexual Activity   Alcohol Use Yes    Frequency: Monthly or less    Drinks per session: 1 or 2    Comment: rarely     Social History     Substance and Sexual Activity   Drug Use Never     Social History     Tobacco Use   Smoking Status Never Smoker   Smokeless Tobacco Never Used     Family History: non-contributory    Meds/Allergies   all current active meds have been reviewed  No Known Allergies    Objective   Vitals: Blood pressure (!) 179/94, pulse 79, temperature 98 9 °F (37 2 °C), temperature source Temporal, resp  rate 20, SpO2 98 %, not currently breastfeeding  No intake or output data in the 24 hours ending 04/03/21 0826  Invasive Devices     Peripheral Intravenous Line            Peripheral IV 04/02/21 Right Hand less than 1 day                Physical Exam  Constitutional:       Appearance: Normal appearance  HENT:      Head: Normocephalic and atraumatic        Nose: Nose normal       Mouth/Throat: Mouth: Mucous membranes are moist       Pharynx: Oropharynx is clear  Eyes:      Extraocular Movements: Extraocular movements intact  Pupils: Pupils are equal, round, and reactive to light  Neck:      Musculoskeletal: Normal range of motion and neck supple  Cardiovascular:      Rate and Rhythm: Normal rate and regular rhythm  Pulmonary:      Effort: Pulmonary effort is normal       Breath sounds: Normal breath sounds  Abdominal:      General: Abdomen is flat  Bowel sounds are normal       Palpations: Abdomen is soft  Musculoskeletal:      Comments: LLE foot gauze over wound   Skin:     General: Skin is warm and dry  Neurological:      General: No focal deficit present  Mental Status: She is alert and oriented to person, place, and time  Psychiatric:         Mood and Affect: Mood normal          Behavior: Behavior normal          Lab Results: I have personally reviewed pertinent reports  Imaging Studies: I have personally reviewed pertinent reports  EKG, Pathology, and Other Studies: I have personally reviewed pertinent reports  VTE Prophylaxis: Sequential compression device (Venodyne)               Code Status: Level 1 - Full Code  Advance Directive and Living Will:      Power of :    POLST:      Counseling / Coordination of Care  Total floor / unit time spent today 30 minutes  minutes  Greater than 50% of total time was spent with the patient and / or family counseling and / or coordination of care   A description of the counseling / coordination of care:  Patient is low risk for cardiopulmonary complications nano and postoperative

## 2021-04-04 LAB
ANION GAP SERPL CALCULATED.3IONS-SCNC: 7 MMOL/L (ref 4–13)
BASOPHILS # BLD AUTO: 0.03 THOUSANDS/ΜL (ref 0–0.1)
BASOPHILS NFR BLD AUTO: 1 % (ref 0–1)
BUN SERPL-MCNC: 12 MG/DL (ref 5–25)
CALCIUM SERPL-MCNC: 8.8 MG/DL (ref 8.3–10.1)
CHLORIDE SERPL-SCNC: 105 MMOL/L (ref 100–108)
CO2 SERPL-SCNC: 28 MMOL/L (ref 21–32)
CREAT SERPL-MCNC: 0.82 MG/DL (ref 0.6–1.3)
CRP SERPL QL: 12.2 MG/L
EOSINOPHIL # BLD AUTO: 0.1 THOUSAND/ΜL (ref 0–0.61)
EOSINOPHIL NFR BLD AUTO: 2 % (ref 0–6)
ERYTHROCYTE [DISTWIDTH] IN BLOOD BY AUTOMATED COUNT: 13.5 % (ref 11.6–15.1)
ERYTHROCYTE [SEDIMENTATION RATE] IN BLOOD: 18 MM/HOUR (ref 0–29)
GFR SERPL CREATININE-BSD FRML MDRD: 79 ML/MIN/1.73SQ M
GLUCOSE SERPL-MCNC: 128 MG/DL (ref 65–140)
GLUCOSE SERPL-MCNC: 129 MG/DL (ref 65–140)
GLUCOSE SERPL-MCNC: 138 MG/DL (ref 65–140)
GLUCOSE SERPL-MCNC: 156 MG/DL (ref 65–140)
GLUCOSE SERPL-MCNC: 161 MG/DL (ref 65–140)
HCT VFR BLD AUTO: 31.5 % (ref 34.8–46.1)
HGB BLD-MCNC: 10.1 G/DL (ref 11.5–15.4)
IMM GRANULOCYTES # BLD AUTO: 0.04 THOUSAND/UL (ref 0–0.2)
IMM GRANULOCYTES NFR BLD AUTO: 1 % (ref 0–2)
LYMPHOCYTES # BLD AUTO: 0.59 THOUSANDS/ΜL (ref 0.6–4.47)
LYMPHOCYTES NFR BLD AUTO: 9 % (ref 14–44)
MCH RBC QN AUTO: 29.1 PG (ref 26.8–34.3)
MCHC RBC AUTO-ENTMCNC: 32.1 G/DL (ref 31.4–37.4)
MCV RBC AUTO: 91 FL (ref 82–98)
MONOCYTES # BLD AUTO: 0.72 THOUSAND/ΜL (ref 0.17–1.22)
MONOCYTES NFR BLD AUTO: 11 % (ref 4–12)
NEUTROPHILS # BLD AUTO: 4.87 THOUSANDS/ΜL (ref 1.85–7.62)
NEUTS SEG NFR BLD AUTO: 76 % (ref 43–75)
NRBC BLD AUTO-RTO: 0 /100 WBCS
PLATELET # BLD AUTO: 227 THOUSANDS/UL (ref 149–390)
PMV BLD AUTO: 10 FL (ref 8.9–12.7)
POTASSIUM SERPL-SCNC: 4.5 MMOL/L (ref 3.5–5.3)
RBC # BLD AUTO: 3.47 MILLION/UL (ref 3.81–5.12)
SODIUM SERPL-SCNC: 140 MMOL/L (ref 136–145)
WBC # BLD AUTO: 6.35 THOUSAND/UL (ref 4.31–10.16)

## 2021-04-04 PROCEDURE — 97161 PT EVAL LOW COMPLEX 20 MIN: CPT

## 2021-04-04 PROCEDURE — 99232 SBSQ HOSP IP/OBS MODERATE 35: CPT | Performed by: INTERNAL MEDICINE

## 2021-04-04 PROCEDURE — 85025 COMPLETE CBC W/AUTO DIFF WBC: CPT | Performed by: INTERNAL MEDICINE

## 2021-04-04 PROCEDURE — 82948 REAGENT STRIP/BLOOD GLUCOSE: CPT

## 2021-04-04 PROCEDURE — 86140 C-REACTIVE PROTEIN: CPT | Performed by: INTERNAL MEDICINE

## 2021-04-04 PROCEDURE — 85652 RBC SED RATE AUTOMATED: CPT | Performed by: INTERNAL MEDICINE

## 2021-04-04 PROCEDURE — 80048 BASIC METABOLIC PNL TOTAL CA: CPT | Performed by: INTERNAL MEDICINE

## 2021-04-04 RX ADMIN — METRONIDAZOLE 500 MG: 500 TABLET ORAL at 00:31

## 2021-04-04 RX ADMIN — INSULIN LISPRO 1 UNITS: 100 INJECTION, SOLUTION INTRAVENOUS; SUBCUTANEOUS at 12:08

## 2021-04-04 RX ADMIN — COLLAGENASE SANTYL: 250 OINTMENT TOPICAL at 12:19

## 2021-04-04 RX ADMIN — ASPIRIN 81 MG CHEWABLE TABLET 81 MG: 81 TABLET CHEWABLE at 08:26

## 2021-04-04 RX ADMIN — CEFAZOLIN SODIUM 2000 MG: 2 SOLUTION INTRAVENOUS at 17:26

## 2021-04-04 RX ADMIN — ACETAMINOPHEN 650 MG: 325 TABLET, FILM COATED ORAL at 09:43

## 2021-04-04 RX ADMIN — CEFAZOLIN SODIUM 2000 MG: 2 SOLUTION INTRAVENOUS at 00:32

## 2021-04-04 RX ADMIN — OXYCODONE HYDROCHLORIDE AND ACETAMINOPHEN 250 MG: 500 TABLET ORAL at 08:26

## 2021-04-04 RX ADMIN — ENOXAPARIN SODIUM 40 MG: 40 INJECTION SUBCUTANEOUS at 08:26

## 2021-04-04 RX ADMIN — LOSARTAN POTASSIUM 25 MG: 25 TABLET, FILM COATED ORAL at 08:26

## 2021-04-04 RX ADMIN — METRONIDAZOLE 500 MG: 500 TABLET ORAL at 17:26

## 2021-04-04 RX ADMIN — CEFAZOLIN SODIUM 2000 MG: 2 SOLUTION INTRAVENOUS at 08:26

## 2021-04-04 RX ADMIN — INSULIN GLARGINE 18 UNITS: 100 INJECTION, SOLUTION SUBCUTANEOUS at 21:28

## 2021-04-04 RX ADMIN — ACETAMINOPHEN 650 MG: 325 TABLET, FILM COATED ORAL at 03:03

## 2021-04-04 RX ADMIN — INSULIN LISPRO 1 UNITS: 100 INJECTION, SOLUTION INTRAVENOUS; SUBCUTANEOUS at 21:28

## 2021-04-04 RX ADMIN — METRONIDAZOLE 500 MG: 500 TABLET ORAL at 08:26

## 2021-04-04 RX ADMIN — AMLODIPINE BESYLATE 5 MG: 5 TABLET ORAL at 08:26

## 2021-04-04 RX ADMIN — ACETAMINOPHEN 650 MG: 325 TABLET, FILM COATED ORAL at 17:26

## 2021-04-04 NOTE — PROGRESS NOTES
Progress Note - Infectious Disease   Jerrod Bright 64 y o  female MRN: [id number]  Unit/Bed#: E2 -01 Encounter: [id number]      Impression/Plan:  1  Infected left foot ulceration-in the setting of hardware in place with progressive lucency around the hardware suggesting the possibility of hardware infection with osteomyelitis  The patient has undergone removal of the lateral hardware although there is some hardware that remains in the mid to medial foot  Unfortunately no operative cultures were obtained   -continue cefazolin Flagyl for now  -recheck CBC with diff and BMP  -podiatry will assess whether office cultures are available although this certainly would not be as helpful as deep operative cultures  -follow up pathology and if remaining osteomyelitis at the margins, additional surgical intervention will be indicated  -close podiatry follow-up  -local wound care  -if margins are negative, can discontinue all antibiotics; discussed this with Podiatry     2  Left foot cellulitis-in the setting of previous reconstruction of the Charcot foot  Status postoperative intervention as above   -antibiotics as above  -serial exams     3  Diabetes mellitus-type 2  With long-term insulin use  With reasonable glucose control with a hemoglobin A1c of 5 8    Discussed the above management plan with the Podiatry service    Antibiotics:  Cefazolin 3  Flagyl 3  Postop day 1    Subjective:  Patient has no fever, chills, sweats; had low-grade fever earlier this morning; no nausea, vomiting, diarrhea; no cough, shortness of breath; no pain  No new symptoms      Objective:  Vitals:  Temp:  [97 7 °F (36 5 °C)-100 5 °F (38 1 °C)] 99 2 °F (37 3 °C)  HR:  [77-94] 82  Resp:  [18-20] 20  BP: (131-161)/(65-83) 154/83  SpO2:  [94 %-98 %] 98 %  Temp (24hrs), Av 8 °F (37 1 °C), Min:97 7 °F (36 5 °C), Max:100 5 °F (38 1 °C)  Current: Temperature: 99 2 °F (37 3 °C)    Physical Exam:   General Appearance:  Alert, interactive, nontoxic, no acute distress  Throat: Oropharynx moist without lesions  Lungs:   Clear to auscultation bilaterally; no wheezes, rhonchi or rales; respirations unlabored   Heart:  RRR; no murmur, rub or gallop   Abdomen:   Soft, non-tender, non-distended, positive bowel sounds  Extremities: No clubbing, cyanosis  Left foot heavily dressed with a dry dressing in place  Skin: No new rashes or lesions  No draining wounds noted         Labs, Imaging, & Other studies:   All pertinent labs and imaging studies were personally reviewed  Results from last 7 days   Lab Units 04/04/21  0434 04/02/21  1705 04/02/21  0833   WBC Thousand/uL 6 35  --  5 66   HEMOGLOBIN g/dL 10 1*  --  11 6   PLATELETS Thousands/uL 227 287 310     Results from last 7 days   Lab Units 04/04/21  0434 04/02/21  0833   SODIUM mmol/L 140 142   POTASSIUM mmol/L 4 5 4 8   CHLORIDE mmol/L 105 111*   CO2 mmol/L 28 29   BUN mg/dL 12 21   CREATININE mg/dL 0 82 0 85   EGFR ml/min/1 73sq m 79 76   CALCIUM mg/dL 8 8 9 6             Results from last 7 days   Lab Units 04/04/21  0434   CRP mg/L 12 2*

## 2021-04-04 NOTE — PROGRESS NOTES
Podiatry - Progress Note  Patient: Seferino Goodson 64 y o  female   MRN: [id number]  PCP: Leopold Leisure, DO  Unit/Bed#: E2 -01 Encounter: [id number]  Date Of Visit: 04/04/21    ASSESSMENT:    Seferino Goodson is a 64 y o  female with:    1  Left foot ulceration holman 3       - S/p I&D, removal of hardware, resection of base of 5th metatarsal base (DOS:4/4)  2  Left foot cellulitis  3  Left foot charcot neuroarthropathy s/p reconstruction  4  Right hallux wound  5  Diabetes Type 2, A1c 5 8% 7/23/2020      PLAN:    · Post-surgical dressing located on surgical site and affected extremity were left intact today  · F/u intra op path  · Continue current antibiotics  · Will plan for first complete post-surgical dressing change tomorrow  · Right hallux wound was debrided without an incident  Pt will continue her out pt therapy with santyl  Wound care instruction placed  · Vitals signs stable  Patient afebrile with no leukocytosis  No erythema, edema, or lymphangitis noted either proximal or distal to the dressings  · Pain is well controlled  Continue current pain management regimen  · Elevation on green foam wedges or pillows when non-ambulatory    Weightbearing status: strict NWB LLE  Wound debridement note: After verbal consent was obtained, wound located at right plantar medial hallux was excisionally debrided with 10 blade all nonviable, devitalized, fibrous, hyperkeratotic tissue w/ excision of skin  to depth of subcutaneous tissue  Post debridement wound measurements 0 5x0 3x0 1cm, with appearance of wound fresh bleeding tissue, viable with viable 100%  <20sq cm debrided  SUBJECTIVE:     The patient was seen, evaluated, and assessed at bedside today  The patient was awake, alert, and in no acute distress  The patient reports no pain at this time  Pain is well controlled with current pain management regimen  Patient reports normal appetite and using the restroom postoperatively   Patient denies N/V/F/chills/SOB/CP  OBJECTIVE:     Vitals:   /76 (BP Location: Left arm)   Pulse 82   Temp 97 7 °F (36 5 °C) (Temporal)   Resp 20   SpO2 98%     Temp (24hrs), Av 2 °F (36 8 °C), Min:97 7 °F (36 5 °C), Max:98 8 °F (37 1 °C)      Physical Exam:     General:  Alert, cooperative, and in no distress  Lower extremity exam:  Cardiovascular status at baseline  Neurological status at baseline  Musculoskeletal status at baseline  No erythema, edema, or lymphangitis noted from dressing  Lower extremity temperature WNL  Wound #: 1  Location: right medial plantar hallux  Length 0 5cm: Width 0 3cm: Depth 0 1cm:   Peripheral Skin Description: Attached  Granulation: 90% Fibrotic Tissue: 10% Necrotic Tissue: 0%   Drainage Amount: minimal, to none  Signs of Infection: No  Total debrided <20 square centimeters    pre-debridement post-debridement            Additional Data:     Labs:    Results from last 7 days   Lab Units 21  0434   WBC Thousand/uL 6 35   HEMOGLOBIN g/dL 10 1*   HEMATOCRIT % 31 5*   PLATELETS Thousands/uL 227   NEUTROS PCT % 76*   LYMPHS PCT % 9*   MONOS PCT % 11   EOS PCT % 2     Results from last 7 days   Lab Units 21  0434   POTASSIUM mmol/L 4 5   CHLORIDE mmol/L 105   CO2 mmol/L 28   BUN mg/dL 12   CREATININE mg/dL 0 82   CALCIUM mg/dL 8 8           * I Have Reviewed All Lab Data Listed Above  Recent Cultures (last 7 days):               Imaging: I have personally reviewed pertinent films in PACS  EKG, Pathology, and Other Studies: I have personally reviewed pertinent reports  ** Please Note: Portions of the record may have been created with voice recognition software  Occasional wrong word or "sound a like" substitutions may have occurred due to the inherent limitations of voice recognition software  Read the chart carefully and recognize, using context, where substitutions have occurred   **

## 2021-04-04 NOTE — PLAN OF CARE
Problem: Potential for Falls  Goal: Patient will remain free of falls  Description: INTERVENTIONS:  - Assess patient frequently for physical needs  -  Identify cognitive and physical deficits and behaviors that affect risk of falls  -  [place] fall precautions as indicated by assessment   - Educate patient/family on patient safety including physical limitations  - Instruct patient to call for assistance with activity based on assessment  - Modify environment to reduce risk of injury  - Consider OT/PT consult to assist with strengthening/mobility  Outcome: Progressing     Problem: METABOLIC, FLUID AND ELECTROLYTES - ADULT  Goal: Glucose maintained within target range  Description: INTERVENTIONS:  - Monitor Blood Glucose as ordered  - Assess for signs and symptoms of hyperglycemia and hypoglycemia  - Administer ordered medications to maintain glucose within target range  - Assess nutritional intake and initiate nutrition service referral as needed  Outcome: Progressing     Problem: PAIN - ADULT  Goal: Verbalizes/displays adequate comfort level or baseline comfort level  Description: Interventions:  - Encourage patient to monitor pain and request assistance  - Assess pain using appropriate pain scale  - Administer analgesics based on type and severity of pain and evaluate response  - Implement non-pharmacological measures as appropriate and evaluate response  - Consider cultural and social influences on pain and pain management  - Notify physician/advanced practitioner if interventions unsuccessful or patient reports new pain  Outcome: Progressing     Problem: SAFETY ADULT  Goal: Patient will remain free of falls  Description: INTERVENTIONS:  - Assess patient frequently for physical needs  -  Identify cognitive and physical deficits and behaviors that affect risk of falls    -  [place] fall precautions as indicated by assessment   - Educate patient/family on patient safety including physical limitations  - Instruct patient to call for assistance with activity based on assessment  - Modify environment to reduce risk of injury  - Consider OT/PT consult to assist with strengthening/mobility  Outcome: Progressing  Goal: Maintain or return to baseline ADL function  Description: INTERVENTIONS:  -  Assess patient's ability to carry out ADLs; assess patient's baseline for ADL function and identify physical deficits which impact ability to perform ADLs (bathing, care of mouth/teeth, toileting, grooming, dressing, etc )  - Assess/evaluate cause of self-care deficits   - Assess range of motion  - Assess patient's mobility; develop plan if impaired  - Assess patient's need for assistive devices and provide as appropriate  - Encourage maximum independence but intervene and supervise when necessary  - Involve family in performance of ADLs  - Assess for home care needs following discharge   - Consider OT consult to assist with ADL evaluation and planning for discharge  - Provide patient education as appropriate  Outcome: Progressing  Goal: Maintain or return mobility status to optimal level  Description: INTERVENTIONS:  - Assess patient's baseline mobility status (ambulation, transfers, stairs, etc )    - Identify cognitive and physical deficits and behaviors that affect mobility  - Identify mobility aids required to assist with transfers and/or ambulation (gait belt, sit-to-stand, lift, walker, cane, etc )  - [place] fall precautions as indicated by assessment  - Record patient progress and toleration of activity level on Mobility SBAR; progress patient to next Phase/Stage  - Instruct patient to call for assistance with activity based on assessment  - Consider rehabilitation consult to assist with strengthening/weightbearing, etc   Outcome: Progressing     Problem: DISCHARGE PLANNING  Goal: Discharge to home or other facility with appropriate resources  Description: INTERVENTIONS:  - Identify barriers to discharge w/patient and caregiver  - Arrange for needed discharge resources and transportation as appropriate  - Identify discharge learning needs (meds, wound care, etc )  - Arrange for interpretive services to assist at discharge as needed  - Refer to Case Management Department for coordinating discharge planning if the patient needs post-hospital services based on physician/advanced practitioner order or complex needs related to functional status, cognitive ability, or social support system  Outcome: Progressing     Problem: Knowledge Deficit  Goal: Patient/family/caregiver demonstrates understanding of disease process, treatment plan, medications, and discharge instructions  Description: Complete learning assessment and assess knowledge base    Interventions:  - Provide teaching at level of understanding  - Provide teaching via preferred learning methods  Outcome: Progressing

## 2021-04-04 NOTE — PROGRESS NOTES
Wound care orders for right great toe - santyl, moist 2x2, dry 2x2, loc  Patient refusing moist 2x2 as she states she was previously informed not to do that  Patient has been managing wound on her own  Wound care done today - santyl, 2x2, loc  Patient will confirm with podiatry tomorrow wound care instructions  Nurse to follow up tomorrow as well

## 2021-04-04 NOTE — PROGRESS NOTES
Progress Note - Higinio Yates 64 y o  female MRN: [id number]    Unit/Bed#: E2 -01 Encounter: [id number]    Assessment/Plan:    Left foot ulceration/infection   status post I and D with removal of hardware, infected bone and soft tissue 5th metatarsal, postop care by Podiatry, on Ancef and Flagyl    Diabetes     continue Lantus with sliding scale and ADA diet     Hypertension     controlled with Norvasc and losartan    Acute postop anemia   hemoglobin 10 1 after surgery, continue to monitor with Podiatry    Subjective:   Some discomfort in her left foot, mild nausea overnight, denies chest pain shortness of breath no vomiting no fevers chills appetite good      Objective:     Vitals: Blood pressure 140/69, pulse 77, temperature 99 °F (37 2 °C), temperature source Temporal, resp  rate 18, SpO2 96 %, not currently breastfeeding  ,There is no height or weight on file to calculate BMI        Results from last 7 days   Lab Units 04/04/21  0434   WBC Thousand/uL 6 35   HEMOGLOBIN g/dL 10 1*   HEMATOCRIT % 31 5*   PLATELETS Thousands/uL 227     Results from last 7 days   Lab Units 04/04/21  0434   POTASSIUM mmol/L 4 5   CHLORIDE mmol/L 105   CO2 mmol/L 28   BUN mg/dL 12   CREATININE mg/dL 0 82   CALCIUM mg/dL 8 8       Scheduled Meds:  Current Facility-Administered Medications   Medication Dose Route Frequency Provider Last Rate    acetaminophen  650 mg Oral Q6H PRN Aiden Robles DPM      amLODIPine  5 mg Oral Daily [address] [place]      ascorbic acid  250 mg Oral Daily [address] [facility]      aspirin  81 mg Oral Daily [address] [facility]      cefazolin  2,000 mg Intravenous [facility] [place], DPM 2,000 mg (04/04/21 0826)    enoxaparin  40 mg Subcutaneous Daily Aiden Robles DPM      insulin glargine  18 Units Subcutaneous HS Marlen Armas DPVASYL      insulin lispro  1-5 Units Subcutaneous TID AC Marlen Armas DPM      insulin lispro  1-5 Units Subcutaneous HS [address] [place], DPM      losartan  25 mg Oral Daily [address] [place], [place]      metroNIDAZOLE  500 mg Oral [facility] [address] [place], DPM         Continuous Infusions:         Physical exam:  General appearance:  Alert no distress interaction appropriate  Head/Eyes:  Nonicteric PERRL EOMI  Neck:  Supple  Lungs: CTA bilateral no wheezing rhonchi or rales  Heart: normal S1 S2 regular  Abdomen: Soft nontender with bowel sounds  Extremities: no edema left foot gauze over wound  Skin: no rash    Invasive Devices     Peripheral Intravenous Line            Peripheral IV 04/02/21 Right Hand 1 day                  VTE Pharmacologic Prophylaxis:  Lovenox      Counseling / Coordination of Care  Total floor / unit time spent today 30  minutes  Greater than 50% of total time was spent with the patient and / or family counseling and / or coordination of care  A description of the counseling / coordination of care: Ira Pierre

## 2021-04-04 NOTE — PLAN OF CARE
Problem: Potential for Falls  Goal: Patient will remain free of falls  Description: INTERVENTIONS:  - Assess patient frequently for physical needs  -  Identify cognitive and physical deficits and behaviors that affect risk of falls  -  [place] fall precautions as indicated by assessment   - Educate patient/family on patient safety including physical limitations  - Instruct patient to call for assistance with activity based on assessment  - Modify environment to reduce risk of injury  - Consider OT/PT consult to assist with strengthening/mobility  Outcome: Progressing     Problem: METABOLIC, FLUID AND ELECTROLYTES - ADULT  Goal: Glucose maintained within target range  Description: INTERVENTIONS:  - Monitor Blood Glucose as ordered  - Assess for signs and symptoms of hyperglycemia and hypoglycemia  - Administer ordered medications to maintain glucose within target range  - Assess nutritional intake and initiate nutrition service referral as needed  Outcome: Progressing     Problem: PAIN - ADULT  Goal: Verbalizes/displays adequate comfort level or baseline comfort level  Description: Interventions:  - Encourage patient to monitor pain and request assistance  - Assess pain using appropriate pain scale  - Administer analgesics based on type and severity of pain and evaluate response  - Implement non-pharmacological measures as appropriate and evaluate response  - Consider cultural and social influences on pain and pain management  - Notify physician/advanced practitioner if interventions unsuccessful or patient reports new pain  Outcome: Progressing     Problem: SAFETY ADULT  Goal: Patient will remain free of falls  Description: INTERVENTIONS:  - Assess patient frequently for physical needs  -  Identify cognitive and physical deficits and behaviors that affect risk of falls    -  [place] fall precautions as indicated by assessment   - Educate patient/family on patient safety including physical limitations  - Instruct patient to call for assistance with activity based on assessment  - Modify environment to reduce risk of injury  - Consider OT/PT consult to assist with strengthening/mobility  Outcome: Progressing  Goal: Maintain or return to baseline ADL function  Description: INTERVENTIONS:  -  Assess patient's ability to carry out ADLs; assess patient's baseline for ADL function and identify physical deficits which impact ability to perform ADLs (bathing, care of mouth/teeth, toileting, grooming, dressing, etc )  - Assess/evaluate cause of self-care deficits   - Assess range of motion  - Assess patient's mobility; develop plan if impaired  - Assess patient's need for assistive devices and provide as appropriate  - Encourage maximum independence but intervene and supervise when necessary  - Involve family in performance of ADLs  - Assess for home care needs following discharge   - Consider OT consult to assist with ADL evaluation and planning for discharge  - Provide patient education as appropriate  Outcome: Progressing  Goal: Maintain or return mobility status to optimal level  Description: INTERVENTIONS:  - Assess patient's baseline mobility status (ambulation, transfers, stairs, etc )    - Identify cognitive and physical deficits and behaviors that affect mobility  - Identify mobility aids required to assist with transfers and/or ambulation (gait belt, sit-to-stand, lift, walker, cane, etc )  - [place] fall precautions as indicated by assessment  - Record patient progress and toleration of activity level on Mobility SBAR; progress patient to next Phase/Stage  - Instruct patient to call for assistance with activity based on assessment  - Consider rehabilitation consult to assist with strengthening/weightbearing, etc   Outcome: Progressing     Problem: DISCHARGE PLANNING  Goal: Discharge to home or other facility with appropriate resources  Description: INTERVENTIONS:  - Identify barriers to discharge w/patient and caregiver  - Arrange for needed discharge resources and transportation as appropriate  - Identify discharge learning needs (meds, wound care, etc )  - Arrange for interpretive services to assist at discharge as needed  - Refer to Case Management Department for coordinating discharge planning if the patient needs post-hospital services based on physician/advanced practitioner order or complex needs related to functional status, cognitive ability, or social support system  Outcome: Progressing     Problem: Knowledge Deficit  Goal: Patient/family/caregiver demonstrates understanding of disease process, treatment plan, medications, and discharge instructions  Description: Complete learning assessment and assess knowledge base    Interventions:  - Provide teaching at level of understanding  - Provide teaching via preferred learning methods  Outcome: Progressing

## 2021-04-04 NOTE — OCCUPATIONAL THERAPY NOTE
Occupational Therapy Screen        Patient Name: Kami REIDWCIAurelioM Date: 4/4/2021      OT consult received  Per PT, pt w/ h/o of NWB and pt independent w/ ADLs  And functional transfers and mobility  Pt w/ no OT concerns at this time  Will d/c OT      Keron Villafana MS, OTR/L;

## 2021-04-04 NOTE — PHYSICAL THERAPY NOTE
Physical Therapy Evaluation     Patient's Name: Diandra Valdez    Admitting Diagnosis  Cellulitis of foot, left [L03 [id number]]    Problem List  Patient Active Problem List   Diagnosis    Delayed emergence from anesthesia    HTN (hypertension)    [facility]terine cancer ([facility] )    Diabetes mellitus, type 2 ([place] [address] )    Charcot's joint of left foot    Diabetic foot infection ([place] [address] )       Past Medical History  Past Medical History:   Diagnosis Date    Anesthesia complication     difficulty awakening    Arthritis     right hand middle finger    Cancer ([place] [address] )     uterine- radical  hysterectomy/ chemo and radiation    Charcot's joint, left ankle and foot     Colon polyp     Diabetes mellitus ([place] [address] )     IDDM    Hypertension     Ulcer of great toe ([place] [address] )     right- pt changes dsg daily    Unable to bear weight     LLE- using knee scooter       Past Surgical History  Past Surgical History:   Procedure Laterality Date     SECTION      x 3    COLONOSCOPY      FOOT FUSION Left 10/15/2020    Procedure: CHARCOT RECONSTRUCTION WITH MIDFOOT, MEDIAL COLUMN FUSION; INTERNAL, achiles lengthening;  Surgeon: Nicolette Wright DPM;  [facility];  Service: Podiatry    HARDWARE REMOVAL N/A 2020    Procedure: LEFT FOOT FRAME REMOVAL;  Surgeon: Nicolette Wright DPM;  [facility];  Service: Podiatry    HYSTERECTOMY      JOINT REPLACEMENT Right     TKR    KS APPLY BONE UNIPLANE,EXT FIX DEV Left 2020    Procedure: EX-FIX APPLICATION;  Surgeon: Nicolette Wright DPM;  [facility];  Service: Podiatry    KS ELECT BONE STIM NONINVASIVE Left 2020    Procedure: BONE STIM APPLICATION & activation;  Surgeon: Nicolette Wright DPM;  [facility];  Service: Podiatry    KS GASTROCNEMIUS RECESSION Left 2020    Procedure: ENDO GASTROC RECESSION;  Surgeon: Nicolette Wright DPM;  [facility];  Service: Podiatry    WOUND DEBRIDEMENT Bilateral 2020    Procedure: DEBRIDE ULCER W/GRAFT APPS; Surgeon: Virgie Martinez DPM;  [facility];  Service: Podiatry            04/04/21 1216   Note Type   Note type Evaluation   Pain Assessment   Pain Assessment Tool 0-10   Pain Score 3   Pain Location/Orientation Orientation: Left; Location: Foot   Home Living   Type of [address] Two level   Bathroom Shower/Tub Tub/shower unit   [address] Walker;Cane   Prior Function   Level of [place] Independent with ADLs and functional mobility   Lives With Spouse   Receives Help From Family   ADL Assistance Independent   IADLs Independent   Falls in the last 6 months 1 to 4  (2-3 on old rollabout , recieved new one that is hardier)   Restrictions/Precautions   [place] Bearing Precautions Per Order Yes   LLE Weight Bearing Per Order NWB   Cognition   Overall Cognitive Status WFL   Arousal/Participation Alert   Orientation Level Oriented X4   Following Commands Follows all commands and directions without difficulty   RLE Assessment   RLE Assessment X   Strength RLE   RLE Overall Strength 4/5   LLE Assessment   LLE Assessment X   Strength LLE   LLE Overall Strength 4/5   Bed Mobility   Supine to Sit 6  Modified independent   Additional items HOB elevated   Sit to Supine 6  Modified independent   Additional items HOB elevated   Transfers   Sit to Stand 7  Independent   Stand to Sit 7  Independent   Additional Comments Mount/disomount on rollabout  x 250' in hallway, able to align up with bed for transfer  Trialed RW x 10' total with 5' forward/turn and return to bed  VC for correct sequencing and to utilize UE more  Discussed practicing with  present at home, pt  reports she would prefer to use rollabout for now  Pt  is independent with rollabout for transfers  Reports independence with stair navigation at home  Pt  able to demonstrate full HEP at this time for B/L LE   Pt has been NWB since september    Ambulation/Elevation   Gait pattern Decreased foot clearance  (2 point pattern with RW, R LE; NWB LLE)   Gait Assistance 5  Supervision   Additional items Assist x 1   Assistive Device Rolling walker   Distance 5'x2   Balance   Static Sitting Normal   Dynamic Sitting Good   Static Standing Fair   Dynamic Standing Fair -   Ambulatory Fair -   Endurance Deficit   Endurance Deficit No   Activity Tolerance   Activity Tolerance Patient tolerated treatment well   Nurse Made Aware RN ok to see   Assessment   Prognosis Good   Problem List Orthopedic restrictions;Pain; Impaired balance   Assessment Pt is 64 y o  female seen for PT evaluation s/p admit to [address] on 4/2/2021 w/ Diabetic foot infection ([address] )  PT consulted to assess pt's functional mobility and d/c needs  Order placed for PT eval and tx, w/ activity order  Comorbidities affecting pt's physical performance at time of assessment include: HTN, DM, Diabeteic foot infection  S/p hardware removal due to infection  PTA, pt was independent w/ all functional mobility w/ rollabout knee scooter  Personal factors affecting pt at time of IE include: lives in two story house and pt reports independence with bumping up stairs at home  Please find objective findings from PT assessment regarding body systems outlined above with impairments and limitations including impaired balance, gait deviations and pain  The following objective measures performed on IE also reveal limitations: AM-PAC 6-Clicks: 88/74  Pt's clinical presentation is currently stable  seen in pt's presentation   Pt to benefit from continued PT tx to address deficits as defined above and maximize level of functional independent mobility and consistency  From PT/mobility standpoint, recommendation at time of d/c would be home independently w/ no skilled acute PT needs pending progress in order to facilitate return to PLOF  Recommend d/c PT services at this time     Barriers to Discharge None   Goals   Patient Goals to go home   PT Treatment Day 0   Plan Treatment/Interventions Spoke to nursing   PT Frequency   (Pt  independent with mobility with rollabout, d/c PT)   Recommendation   PT Discharge Recommendation Return to previous environment with no needs   Equipment Recommended   (continue with rollabout)   PT - OK to Discharge Yes   AM-PAC Basic Mobility Inpatient   Turning in Bed Without Bedrails 4   Lying on Back to Sitting on Edge of Flat Bed 4   Moving Bed to Chair 4   Standing Up From Chair 4   Walk in Room 3   Climb 3-5 Stairs 4   Basic Mobility Inpatient Raw Score 23   Basic Mobility Standardized Score 50 88       Kina Singleton, PT

## 2021-04-04 NOTE — PLAN OF CARE
Problem: PHYSICAL THERAPY ADULT  Goal: Performs mobility at highest level of function for planned discharge setting  See evaluation for individualized goals  Description: Treatment/Interventions: Spoke to nursing  Equipment Recommended: (continue with rollabout)       See flowsheet documentation for full assessment, interventions and recommendations  Outcome: Progressing  Note: Prognosis: Good  Problem List: Orthopedic restrictions, Pain, Impaired balance  Assessment: Pt is 64 y o  female seen for PT evaluation s/p admit to [facility] on 4/2/2021 w/ Diabetic foot infection ([facility] )  PT consulted to assess pt's functional mobility and d/c needs  Order placed for PT eval and tx, w/ activity order  Comorbidities affecting pt's physical performance at time of assessment include: HTN, DM, Diabeteic foot infection  S/p hardware removal due to infection  PTA, pt was independent w/ all functional mobility w/ rollabout knee scooter  Personal factors affecting pt at time of IE include: lives in two story house and pt reports independence with bumping up stairs at home  Please find objective findings from PT assessment regarding body systems outlined above with impairments and limitations including impaired balance, gait deviations and pain  The following objective measures performed on IE also reveal limitations: AM-PAC 6-Clicks: 04/05  Pt's clinical presentation is currently stable  seen in pt's presentation   Pt to benefit from continued PT tx to address deficits as defined above and maximize level of functional independent mobility and consistency  From PT/mobility standpoint, recommendation at time of d/c would be home independently w/ no skilled acute PT needs pending progress in order to facilitate return to PLOF  Recommend d/c PT services at this time    Barriers to Discharge: None     PT Discharge Recommendation: Return to previous environment with no needs     PT - OK to Discharge: Yes    See flowsheet documentation for full assessment

## 2021-04-05 PROBLEM — D64.9 ANEMIA: Status: ACTIVE | Noted: 2021-04-05

## 2021-04-05 LAB
GLUCOSE SERPL-MCNC: 106 MG/DL (ref 65–140)
GLUCOSE SERPL-MCNC: 118 MG/DL (ref 65–140)
GLUCOSE SERPL-MCNC: 130 MG/DL (ref 65–140)
GLUCOSE SERPL-MCNC: 137 MG/DL (ref 65–140)

## 2021-04-05 PROCEDURE — 99232 SBSQ HOSP IP/OBS MODERATE 35: CPT | Performed by: INTERNAL MEDICINE

## 2021-04-05 PROCEDURE — 99232 SBSQ HOSP IP/OBS MODERATE 35: CPT | Performed by: PHYSICIAN ASSISTANT

## 2021-04-05 PROCEDURE — 82948 REAGENT STRIP/BLOOD GLUCOSE: CPT

## 2021-04-05 RX ADMIN — AMLODIPINE BESYLATE 5 MG: 5 TABLET ORAL at 09:41

## 2021-04-05 RX ADMIN — ACETAMINOPHEN 650 MG: 325 TABLET, FILM COATED ORAL at 22:01

## 2021-04-05 RX ADMIN — CEFAZOLIN SODIUM 2000 MG: 2 SOLUTION INTRAVENOUS at 00:23

## 2021-04-05 RX ADMIN — ACETAMINOPHEN 650 MG: 325 TABLET, FILM COATED ORAL at 09:41

## 2021-04-05 RX ADMIN — METRONIDAZOLE 500 MG: 500 TABLET ORAL at 00:21

## 2021-04-05 RX ADMIN — CEFAZOLIN SODIUM 2000 MG: 2 SOLUTION INTRAVENOUS at 09:41

## 2021-04-05 RX ADMIN — ENOXAPARIN SODIUM 40 MG: 40 INJECTION SUBCUTANEOUS at 09:41

## 2021-04-05 RX ADMIN — INSULIN GLARGINE 18 UNITS: 100 INJECTION, SOLUTION SUBCUTANEOUS at 22:03

## 2021-04-05 RX ADMIN — LOSARTAN POTASSIUM 25 MG: 25 TABLET, FILM COATED ORAL at 09:41

## 2021-04-05 RX ADMIN — COLLAGENASE SANTYL: 250 OINTMENT TOPICAL at 09:55

## 2021-04-05 RX ADMIN — METRONIDAZOLE 500 MG: 500 TABLET ORAL at 09:41

## 2021-04-05 RX ADMIN — ASPIRIN 81 MG CHEWABLE TABLET 81 MG: 81 TABLET CHEWABLE at 09:41

## 2021-04-05 RX ADMIN — OXYCODONE HYDROCHLORIDE AND ACETAMINOPHEN 250 MG: 500 TABLET ORAL at 09:40

## 2021-04-05 RX ADMIN — CEFAZOLIN SODIUM 2000 MG: 2 SOLUTION INTRAVENOUS at 16:44

## 2021-04-05 RX ADMIN — ACETAMINOPHEN 650 MG: 325 TABLET, FILM COATED ORAL at 00:21

## 2021-04-05 NOTE — PROGRESS NOTES
[facility]  Progress Note - Migdalia Distance 1960, 64 y o  female MRN: [id number]  Unit/Bed#: E2 -01 Encounter: [id number]  Primary Care Provider: Suzan Anaya DO   Date and time admitted to hospital: 4/2/2021  2:48 PM    * Diabetic foot infection [facility])  Assessment & Plan  Left foot ulceration/cellulitis   Management per primary team, Podiatry   S/p I&D left foot, removal hardware, removal of infected nonviable bone and soft tissue with 5th metatarsal partial resection on 4/3/21   Infectious Disease on board for antibiotics currently on IV ancef and po flagyl   Antibiotics, DVT prophylaxis, weight bearing status, pain control, PT/OT per primary team      HTN (hypertension)  Assessment & Plan  Maintained on norvasc 5 mg daily and losartan 25 mg daily   Blood pressure monitoring per unit routine     Diabetes mellitus, type 2 [facility])  Assessment & Plan  Lab Results   Component Value Date    HGBA1C 5 8 (H) 07/23/2020       Recent Labs     04/04/21  1647 04/04/21 2011 04/05/21  0758 04/05/21  1102   POCGLU 129 156* 106 118       Blood Sugar Average: Last 72 hrs:  (P) 135 5   Continue lantus 18 units qHS, SSI, Accuchecks   ADA diet     Anemia  Assessment & Plan  Lab Results   Component Value Date    HGB 10 1 (L) 04/04/2021    HGB 11 6 04/02/2021     Monitor post operatively         VTE Pharmacologic Prophylaxis:   Pharmacologic: Enoxaparin (Lovenox)  Mechanical VTE Prophylaxis in Place: No    Patient Centered Rounds: I have performed bedside rounds with nursing staff today  Discussions with Specialists or Other Care Team Provider:     Education and Discussions with Family / Patient: patient at the bedside, family at bedside     Time Spent for Care: 20 minutes  More than 50% of total time spent on counseling and coordination of care as described above      Current Length of Stay: 3 day(s)    Current Patient Status: Inpatient   Certification Statement: The patient will continue to require additional inpatient hospital stay due to left foot infection     Discharge Plan: per primary team     Code Status: Level 1 - Full Code      Subjective:   Christopher Choe is doing very well today  She is using her skooter  She showered today  No chest pain or SOB  No other acute complaints  Objective:     Vitals:   Temp (24hrs), Av 7 °F (37 1 °C), Min:97 3 °F (36 3 °C), Max:99 5 °F (37 5 °C)    Temp:  [97 3 °F (36 3 °C)-99 5 °F (37 5 °C)] 97 3 °F (36 3 °C)  HR:  [77-82] 77  Resp:  [18-20] 18  BP: (130-154)/(67-83) 146/73  SpO2:  [96 %-98 %] 96 %  There is no height or weight on file to calculate BMI  Input and Output Summary (last 24 hours): Intake/Output Summary (Last 24 hours) at 2021 1320  Last data filed at 2021 1100  Gross per 24 hour   Intake 180 ml   Output --   Net 180 ml       Physical Exam:     Physical Exam  Vitals signs and nursing note reviewed  HENT:      Head: Normocephalic  Eyes:      Conjunctiva/sclera: Conjunctivae normal    Cardiovascular:      Rate and Rhythm: Normal rate and regular rhythm  Pulmonary:      Effort: Pulmonary effort is normal       Breath sounds: Normal breath sounds  Abdominal:      General: Bowel sounds are normal       Palpations: Abdomen is soft  Musculoskeletal:         General: Deformity (left foot dressing present, she is using skooter) present  Skin:     General: Skin is warm  Neurological:      General: No focal deficit present  Mental Status: She is alert     Psychiatric:         Mood and Affect: Mood normal            Additional Data:     Labs:    Results from last 7 days   Lab Units 21  0434   WBC Thousand/uL 6 35   HEMOGLOBIN g/dL 10 1*   HEMATOCRIT % 31 5*   PLATELETS Thousands/uL 227   NEUTROS PCT % 76*   LYMPHS PCT % 9*   MONOS PCT % 11   EOS PCT % 2     Results from last 7 days   Lab Units 21  0434   SODIUM mmol/L 140   POTASSIUM mmol/L 4 5   CHLORIDE mmol/L 105   CO2 mmol/L 28   BUN mg/dL 12   CREATININE mg/dL 0  82   ANION GAP mmol/L 7   CALCIUM mg/dL 8 8   GLUCOSE RANDOM mg/dL 128         Results from last 7 days   Lab Units 04/05/21  1102 04/05/21  0758 04/04/21 2011 04/04/21  1647 04/04/21  1113 04/04/21  0653 04/03/21  2103 04/03/21  1612 04/03/21  1235 04/03/21  0611 04/02/21  2042 04/02/21  1630   POC GLUCOSE mg/dl 118 106 156* 129 161* 138 173* 146* 93 94 169* 143*                   * I Have Reviewed All Lab Data Listed Above  * Additional Pertinent Lab Tests Reviewed: No New Labs Available For Today    Imaging:    Imaging Reports Reviewed Today Include: re viewed  Imaging Personally Reviewed by Myself Includes:      Recent Cultures (last 7 days):           Last 24 Hours Medication List:   Current Facility-Administered Medications   Medication Dose Route Frequency Provider Last Rate    acetaminophen  650 mg Oral Q6H PRN Aiden Robles DPM      amLODIPine  5 mg Oral Daily [address] [place], DPM      ascorbic acid  250 mg Oral Daily [address] [place], [place]      aspirin  81 mg Oral Daily [address] [place], [place]      cefazolin  2,000 mg Intravenous Q8H [address] [place], DPM 2,000 mg (04/05/21 0941)    collagenase   Topical Daily [address] [place], DPM      enoxaparin  40 mg Subcutaneous Daily [address] [place], DPM      insulin glargine  18 Units Subcutaneous HS Aiden Robles DPM      insulin lispro  1-5 Units Subcutaneous TID AC Aiden Robles DPM      insulin lispro  1-5 Units Subcutaneous HS Matador Gibran Holloway DPM      losartan  25 mg Oral Daily [address] [place], [place]      metroNIDAZOLE  500 mg Oral [facility] Aiden Robles DPM          Today, Patient Was Seen By: Taras Gann PA-C    ** Please Note: Dictation voice to text software may have been used in the creation of this document   **

## 2021-04-05 NOTE — ASSESSMENT & PLAN NOTE
Maintained on norvasc 5 mg daily and losartan 25 mg daily   Blood pressure monitoring per unit routine

## 2021-04-05 NOTE — ASSESSMENT & PLAN NOTE
Lab Results   Component Value Date    HGBA1C 5 8 (H) 07/23/2020       Recent Labs     04/04/21  1647 04/04/21 2011 04/05/21  0758 04/05/21  1102   POCGLU 129 156* 106 118       Blood Sugar Average: Last 72 hrs:  (P) 135 5   Continue lantus 18 units qHS, SSI, Accuchecks   ADA diet

## 2021-04-05 NOTE — PROGRESS NOTES
Progress Note - Infectious Disease   Rylie Faina 64 y o  female MRN: [id number]  Unit/Bed#: E2 -01 Encounter: [id number]    Impression/Plan:  1  Infected left foot ulceration  In the setting of hardware in place with progressive lucency around the hardware suggesting the possibility of hardware infection with osteomyelitis   The patient has undergone removal of the lateral hardware although there is some hardware that remains in the mid to medial foot  Unfortunately no operative cultures were obtained  She is currently receiving cefazolin and Flagyl which she is tolerating without difficulty  Bone pathology is pending   -continue IV cefazolin  -continue oral Flagyl  -monitor CBC and BMP  -podiatry will assess whether office cultures are available although this certainly would not be as helpful as deep operative cultures  -follow up pathology and if remaining osteomyelitis at the margins, additional surgical intervention will be indicated  -if margins are negative, can discontinue all antibiotics  -serial left foot exams  -local wound care per Podiatry  -continue close follow-up with Podiatry     2  Left foot cellulitis  In the setting of previous reconstruction of Charcot foot   Status postoperative intervention as above  She is following closely with Podiatry   -antibiotics as above  -serial exams  -continue close follow-up with Podiatry     3  Type 2 diabetes mellitus with long-term insulin use  Patient's last hemoglobin A1c was 5 8% on 07/23/2020  Elevated blood glucose is risk factor for wounds and infection  Recommend tight glycemic control, especially in the setting of wound and infection   -blood glucose management per SLIM    Above plan was discussed in detail with patient at the bedside  Antibiotics:  Cefazolin 4  Flagyl 4  Antibiotics 4  Postop 2    Subjective:  Patient reports she is feeling well today    She has no fever, chills, sweats, shakes; no nausea, vomiting, abdominal pain, diarrhea, or dysuria; no cough, shortness of breath, or chest pain  She has no worsening pain in her left foot  She has no concerns with her surgical dressing  She reports she has been working with PT and is doing well in her knee scooter  No new symptoms  Objective:  Vitals:  Temp:  [97 3 °F (36 3 °C)-100 5 °F (38 1 °C)] 97 3 °F (36 3 °C)  HR:  [77-82] 77  Resp:  [18-20] 18  BP: (130-161)/(67-83) 146/73  SpO2:  [94 %-98 %] 96 %  Temp (24hrs), Av °F (37 2 °C), Min:97 3 °F (36 3 °C), Max:100 5 °F (38 1 °C)  Current: Temperature: (!) 97 3 °F (36 3 °C)    Physical Exam:   General Appearance:  Alert, interactive, nontoxic, no acute distress  She appears comfortable sitting up in bed  Lungs:   Clear to auscultation bilaterally; no wheezes, rhonchi or rales; respirations unlabored; she is on room air   Heart:  RRR; no murmur, rub or gallop  Abdomen:   Soft, non-tender, non-distended, positive bowel sounds  Extremities: No cyanosis of right foot; left foot with bulky surgical dressing in overlying Ace, no breakthrough drainage, no spreading erythema from bandage site; left leg is elevated on several pillows  Skin: No new rashes or lesions noted on exposed skin       Labs, Imaging, & Other studies:   All pertinent labs and imaging studies were personally reviewed  Results from last 7 days   Lab Units 21  0434 21  1705 21  0833   WBC Thousand/uL 6 35  --  5 66   HEMOGLOBIN g/dL 10 1*  --  11 6   PLATELETS Thousands/uL 227 287 310     Results from last 7 days   Lab Units 21  0434   POTASSIUM mmol/L 4 5   CHLORIDE mmol/L 105   CO2 mmol/L 28   BUN mg/dL 12   CREATININE mg/dL 0 82   EGFR ml/min/1 73sq m 79   CALCIUM mg/dL 8 8     Results from last 7 days   Lab Units 21  0434   CRP mg/L 12 2*

## 2021-04-05 NOTE — PLAN OF CARE
Problem: Potential for Falls  Goal: Patient will remain free of falls  Description: INTERVENTIONS:  - Assess patient frequently for physical needs  -  Identify cognitive and physical deficits and behaviors that affect risk of falls  -  Jolon fall precautions as indicated by assessment   - Educate patient/family on patient safety including physical limitations  - Instruct patient to call for assistance with activity based on assessment  - Modify environment to reduce risk of injury  - Consider OT/PT consult to assist with strengthening/mobility  Outcome: Progressing     Problem: METABOLIC, FLUID AND ELECTROLYTES - ADULT  Goal: Glucose maintained within target range  Description: INTERVENTIONS:  - Monitor Blood Glucose as ordered  - Assess for signs and symptoms of hyperglycemia and hypoglycemia  - Administer ordered medications to maintain glucose within target range  - Assess nutritional intake and initiate nutrition service referral as needed  Outcome: Progressing     Problem: PAIN - ADULT  Goal: Verbalizes/displays adequate comfort level or baseline comfort level  Description: Interventions:  - Encourage patient to monitor pain and request assistance  - Assess pain using appropriate pain scale  - Administer analgesics based on type and severity of pain and evaluate response  - Implement non-pharmacological measures as appropriate and evaluate response  - Consider cultural and social influences on pain and pain management  - Notify physician/advanced practitioner if interventions unsuccessful or patient reports new pain  Outcome: Progressing     Problem: SAFETY ADULT  Goal: Patient will remain free of falls  Description: INTERVENTIONS:  - Assess patient frequently for physical needs  -  Identify cognitive and physical deficits and behaviors that affect risk of falls    -  Jolon fall precautions as indicated by assessment   - Educate patient/family on patient safety including physical limitations  - Instruct patient to call for assistance with activity based on assessment  - Modify environment to reduce risk of injury  - Consider OT/PT consult to assist with strengthening/mobility  Outcome: Progressing  Goal: Maintain or return to baseline ADL function  Description: INTERVENTIONS:  -  Assess patient's ability to carry out ADLs; assess patient's baseline for ADL function and identify physical deficits which impact ability to perform ADLs (bathing, care of mouth/teeth, toileting, grooming, dressing, etc )  - Assess/evaluate cause of self-care deficits   - Assess range of motion  - Assess patient's mobility; develop plan if impaired  - Assess patient's need for assistive devices and provide as appropriate  - Encourage maximum independence but intervene and supervise when necessary  - Involve family in performance of ADLs  - Assess for home care needs following discharge   - Consider OT consult to assist with ADL evaluation and planning for discharge  - Provide patient education as appropriate  Outcome: Progressing  Goal: Maintain or return mobility status to optimal level  Description: INTERVENTIONS:  - Assess patient's baseline mobility status (ambulation, transfers, stairs, etc )    - Identify cognitive and physical deficits and behaviors that affect mobility  - Identify mobility aids required to assist with transfers and/or ambulation (gait belt, sit-to-stand, lift, walker, cane, etc )  - Holder fall precautions as indicated by assessment  - Record patient progress and toleration of activity level on Mobility SBAR; progress patient to next Phase/Stage  - Instruct patient to call for assistance with activity based on assessment  - Consider rehabilitation consult to assist with strengthening/weightbearing, etc   Outcome: Progressing     Problem: DISCHARGE PLANNING  Goal: Discharge to home or other facility with appropriate resources  Description: INTERVENTIONS:  - Identify barriers to discharge w/patient and caregiver  - Arrange for needed discharge resources and transportation as appropriate  - Identify discharge learning needs (meds, wound care, etc )  - Arrange for interpretive services to assist at discharge as needed  - Refer to Case Management Department for coordinating discharge planning if the patient needs post-hospital services based on physician/advanced practitioner order or complex needs related to functional status, cognitive ability, or social support system  Outcome: Progressing     Problem: Knowledge Deficit  Goal: Patient/family/caregiver demonstrates understanding of disease process, treatment plan, medications, and discharge instructions  Description: Complete learning assessment and assess knowledge base    Interventions:  - Provide teaching at level of understanding  - Provide teaching via preferred learning methods  Outcome: Progressing

## 2021-04-05 NOTE — ASSESSMENT & PLAN NOTE
Lab Results   Component Value Date    HGB 10 1 (L) 04/04/2021    HGB 11 6 04/02/2021     Monitor post operatively

## 2021-04-05 NOTE — ASSESSMENT & PLAN NOTE
Left foot ulceration/cellulitis   Management per primary team, Podiatry   S/p I&D left foot, removal hardware, removal of infected nonviable bone and soft tissue with 5th metatarsal partial resection on 4/3/21   Infectious Disease on board for antibiotics currently on IV ancef and po flagyl   Antibiotics, DVT prophylaxis, weight bearing status, pain control, PT/OT per primary team

## 2021-04-05 NOTE — PROGRESS NOTES
Progress Note - Podiatry  Jung Morgan 64 y o  female MRN: [id number]  Unit/Bed#: E2 -01 Encounter: [id number]    Assessment:  1  Left foot ulceration holman 3       - S/p I&D, removal of hardware, resection of base of 5th metatarsal base (DOS:4/4)  2  Left foot cellulitis  3  Left foot charcot neuroarthropathy s/p reconstruction  4  Right hallux wound  5  Diabetes Type 2, A1c 5 8% 7/23/2020    Plan:    left foot incision site inspected appears stable with intact sutures     Continue elevation of the left lower extremity    nonweightbearing left lower extremity    continue IV antibiotics as per ID recommendations   Medical management per  slim   Plan was discussed with Dr Jina Kwon   Chief Complaint: No chief complaint on file  Subjective: 64 y o  female was seen and evaluated at bedside  Patient denies any acute events overnight  Patient appears to be little anxious due to being in hospital due to ongoing covid pandemic  Blood pressure 130/67, pulse 77, temperature 98 5 °F (36 9 °C), temperature source Oral, resp  rate 18, SpO2 98 %, not currently breastfeeding  There is no height or weight on file to calculate BMI  Physical Exam:   General: Alert, cooperative and no distress  Lower Extremities: Neurovascular status at baseline bilaterally, musculoskeletal function at baseline bilaterally, no calf tenderness bilaterally,  Left foot incision site appears stable with intact sutures  Erythema noted around the incision site consistent postoperatively  Strike through bleeding noted on the dressings  Upon wound inspection there was no active bleeding noted                  Lab, Imaging and other studies:   Results from last 7 days   Lab Units 04/04/21  0434   WBC Thousand/uL 6 35   HEMOGLOBIN g/dL 10 1*   HEMATOCRIT % 31 5*   PLATELETS Thousands/uL 227   NEUTROS PCT % 76*   LYMPHS PCT % 9*   MONOS PCT % 11   EOS PCT % 2     Results from last 7 days   Lab Units 04/04/21  0434   POTASSIUM mmol/L 4 5   CHLORIDE mmol/L 105   CO2 mmol/L 28   BUN mg/dL 12   CREATININE mg/dL 0 82   CALCIUM mg/dL 8 8                     Imaging: I have personally reviewed pertinent films and reports in PACS  EKG, Pathology, and Other Studies: I have personally reviewed pertinent reports  Portions of the record may have been created with voice recognition software  Occasional wrong word or "sound a like" substitutions may have occurred due to the inherent limitations of voice recognition software  Read the chart carefully and recognize, using context, where substitutions have occurred      Sarah Lopez DPM

## 2021-04-06 LAB
GLUCOSE SERPL-MCNC: 129 MG/DL (ref 65–140)
GLUCOSE SERPL-MCNC: 148 MG/DL (ref 65–140)
GLUCOSE SERPL-MCNC: 158 MG/DL (ref 65–140)
GLUCOSE SERPL-MCNC: 185 MG/DL (ref 65–140)

## 2021-04-06 PROCEDURE — 82948 REAGENT STRIP/BLOOD GLUCOSE: CPT

## 2021-04-06 PROCEDURE — 99232 SBSQ HOSP IP/OBS MODERATE 35: CPT | Performed by: PHYSICIAN ASSISTANT

## 2021-04-06 PROCEDURE — 99232 SBSQ HOSP IP/OBS MODERATE 35: CPT | Performed by: INTERNAL MEDICINE

## 2021-04-06 RX ADMIN — COLLAGENASE SANTYL: 250 OINTMENT TOPICAL at 10:08

## 2021-04-06 RX ADMIN — ENOXAPARIN SODIUM 40 MG: 40 INJECTION SUBCUTANEOUS at 10:04

## 2021-04-06 RX ADMIN — OXYCODONE HYDROCHLORIDE AND ACETAMINOPHEN 250 MG: 500 TABLET ORAL at 10:03

## 2021-04-06 RX ADMIN — AMLODIPINE BESYLATE 5 MG: 5 TABLET ORAL at 10:03

## 2021-04-06 RX ADMIN — INSULIN LISPRO 1 UNITS: 100 INJECTION, SOLUTION INTRAVENOUS; SUBCUTANEOUS at 21:56

## 2021-04-06 RX ADMIN — LOSARTAN POTASSIUM 25 MG: 25 TABLET, FILM COATED ORAL at 10:03

## 2021-04-06 RX ADMIN — ACETAMINOPHEN 650 MG: 325 TABLET, FILM COATED ORAL at 22:01

## 2021-04-06 RX ADMIN — CEFAZOLIN SODIUM 2000 MG: 2 SOLUTION INTRAVENOUS at 18:17

## 2021-04-06 RX ADMIN — ASPIRIN 81 MG CHEWABLE TABLET 81 MG: 81 TABLET CHEWABLE at 10:04

## 2021-04-06 RX ADMIN — CEFAZOLIN SODIUM 2000 MG: 2 SOLUTION INTRAVENOUS at 10:04

## 2021-04-06 RX ADMIN — INSULIN LISPRO 1 UNITS: 100 INJECTION, SOLUTION INTRAVENOUS; SUBCUTANEOUS at 16:53

## 2021-04-06 RX ADMIN — INSULIN GLARGINE 18 UNITS: 100 INJECTION, SOLUTION SUBCUTANEOUS at 21:57

## 2021-04-06 RX ADMIN — CEFAZOLIN SODIUM 2000 MG: 2 SOLUTION INTRAVENOUS at 00:56

## 2021-04-06 NOTE — ASSESSMENT & PLAN NOTE
· Maintained on norvasc 5 mg daily and losartan 25 mg daily   · Blood pressure monitoring per unit routine   · Most recent /63

## 2021-04-06 NOTE — PROGRESS NOTES
Progress Note - Infectious Disease   Michel Brar 64 y o  female MRN: [id number]  Unit/Bed#: E2 -01 Encounter: [id number]    Impression/Plan:  1  Infected left foot ulceration  In the setting of hardware in place with progressive lucency around the hardware suggesting the possibility of hardware infection with osteomyelitis   The patient has undergone removal of the lateral hardware although there is some hardware that remains in the medial mid foot  Unfortunately no operative cultures were obtained  She is currently receiving cefazolin and Flagyl which she is tolerating without difficulty  Bone pathology is pending   -continue IV cefazolin  -continue oral Flagyl  -monitor CBC and BMP  -follow up pathology and if remaining osteomyelitis at the margins, additional surgical intervention will be indicated  -if margins are negative, can discontinue all antibiotics  -serial left foot exams  -local wound care per Podiatry  -continue close follow-up with Podiatry     2  Left foot cellulitis  In the setting of previous reconstruction of Charcot foot   Status postoperative intervention as above  She is following closely with Podiatry   -antibiotics as above  -serial exams  -continue close follow-up with Podiatry     3  Type 2 diabetes mellitus with long-term insulin use  Patient's last hemoglobin A1c was 5 8% on 07/23/2020  Elevated blood glucose is risk factor for wounds and infection  Recommend tight glycemic control, especially in the setting of wound and infection   -blood glucose management per SLIM    Above plan was discussed in detail with patient at the bedside  Antibiotics:  Cefazolin 5  Flagyl 5  Antibiotics 5  Postop 3    Subjective:  Patient reports she is feeling good today  No worsening pain in her left foot  She has no concern with her current dressing  She tells me this morning she was able to get up on her scooter get herself washed up in the bathroom    She has no fever, chills, sweats, shakes; no nausea, vomiting, abdominal pain, diarrhea, or dysuria; no cough, shortness of breath, or chest pain  No new symptoms  Objective:  Vitals:  Temp:  [97 3 °F (36 3 °C)-99 1 °F (37 3 °C)] 99 1 °F (37 3 °C)  HR:  [76-80] 76  Resp:  [18-20] 20  BP: (134-150)/(73-82) 134/77  SpO2:  [96 %-98 %] 97 %  Temp (24hrs), Av °F (36 7 °C), Min:97 3 °F (36 3 °C), Max:99 1 °F (37 3 °C)  Current: Temperature: 99 1 °F (37 3 °C)    Physical Exam:   General Appearance:  Alert, interactive, nontoxic, no acute distress  She appears comfortable sitting up in bed  Throat: Oropharynx moist without lesions  Lungs:   Clear to auscultation bilaterally; no wheezes, rhonchi or rales; respirations unlabored; she is on room air  Heart:  RRR; no murmur, rub or gallop  Abdomen:   Soft, non-tender, non-distended, positive bowel sounds  Extremities: No clubbing or cyanosis of right lower extremity; bulky left foot dressing intact without breakthrough drainage, no spreading erythema from bandage site; left leg is elevated on 4 pillows  Skin: No new rashes or lesions noted on exposed skin       Labs, Imaging, & Other studies:   All pertinent labs and imaging studies were personally reviewed  Results from last 7 days   Lab Units 21  0434 21  1705 21  0833   WBC Thousand/uL 6 35  --  5 66   HEMOGLOBIN g/dL 10 1*  --  11 6   PLATELETS Thousands/uL 227 287 310     Results from last 7 days   Lab Units 21  0434   POTASSIUM mmol/L 4 5   CHLORIDE mmol/L 105   CO2 mmol/L 28   BUN mg/dL 12   CREATININE mg/dL 0 82   EGFR ml/min/1 73sq m 79   CALCIUM mg/dL 8 8     Results from last 7 days   Lab Units 21  0434   CRP mg/L 12 2*

## 2021-04-06 NOTE — UTILIZATION REVIEW
Notification of Inpatient Admission/Inpatient Authorization Request   This is a Notification of Inpatient Admission for [address]  Be advised that this patient was admitted to our facility under Inpatient Status  Contact Shavon Sandoval at [phone] for additional admission information  [facility]  DEDICATED -[phone]  Patient Name:   Zachery Doshi   YOB: 1960       [address]   [address]:   [address] [facility]  Tax ID: [id number]  NPI: [id number] Attending Provider/NPI:  Phone:  Address: Wagner Lomax [[id number]]  [phone]  Same as the facility   Place of Service Code: 24 Place of Service Name:  [address] [place] [address]   Start Date: 4/2/21 1448 Discharge Date & Time: No discharge date for patient encounter  Type of Admission: Inpatient Status Discharge Disposition   (if discharged): Home/Self Care   Patient Diagnoses: Cellulitis of foot, left [L03 116]     Orders: Admission Orders (From admission, onward)     Ordered        04/02/21 1646  Inpatient Admission  Once                    Assigned Utilization Review Contact: Radhika Urias  Utilization   Network Utilization Review Department  Phone: [phone]; Fax [phone]  Email: [email or web address]   ATTENTION PAYERS: Please call the assigned Utilization  directly with any questions or concerns ALL voicemails in the department are confidential  Send all requests for admission clinical reviews, approved or denied determinations and any other requests to dedicated fax number belonging to the campus where the patient is receiving treatment

## 2021-04-06 NOTE — UTILIZATION REVIEW
Initial Clinical Review    Admission: Date/Time/Statement:   Admission Orders (From admission, onward)     Ordered        04/02/21 1646  Inpatient Admission  Once                   Orders Placed This Encounter   Procedures    Inpatient Admission     Standing Status:   Standing     Number of Occurrences:   1     Order Specific Question:   Level of Care     Answer:   Med Surg [16]     Order Specific Question:   Estimated length of stay     Answer:   More than 2 Midnights     Order Specific Question:   Certification     Answer:   I certify that inpatient services are medically necessary for this patient for a duration of greater than two midnights  See H&P and MD Progress Notes for additional information about the patient's course of treatment  No chief complaint on file  Assessment/Plan: 65 yo female  patient presented to [facility] as a direct inpatient admission from the [place] [address] office for diabetic foot ulcer  Left foot ulceration holman 3 Patient has extensive history of left foot Charcot neuro arthropathy with multiple reconstructive procedures being undergone last year  She states that approximately 1 week ago 03/27/2021 she began noticing itching sensation of left lateral foot  After scratching this next day 3/28 her  noticed that there was an opening on her left foot  She began to notice blood draining from the wound and redness surrounding the new ulcer     04-03-21     Pre-op Diagnosis:  Charcot's joint of left foot [M14 672]  Diabetic foot infection ([facility] [address] ) [L93 847, L08 9]     Post-Op Diagnosis Codes:     * Charcot's joint of left foot [M14 672]     * Diabetic foot infection ([facility] [address] ) [E11 628, L08 9]     Procedure(s) (LRB):  INCISION AND DRAINAGE (I&D) FOOT; Removal of hardware; Removal of infected nonviable deformed bone and soft tissue with fifth metatarsal partial resection (Left)  General  Operative Findings:  Viable bleeding tissue, hypertrophic 5th metatarsal base        ADMITTING  Vitals Temperature Pulse Respirations Blood Pressure SpO2   04/02/21 1449 04/02/21 1449 04/02/21 1449 04/02/21 1449 04/02/21 1449   99 3 °F (37 4 °C) 93 18 132/78 99 %      Temp Source Heart Rate Source Patient Position - Orthostatic VS BP Location FiO2 (%)   04/02/21 1449 04/03/21 0840 04/02/21 1449 04/02/21 1449 --   Temporal Monitor Lying Right arm       Pain Score       04/02/21 1449       No Pain          Wt Readings from Last 1 Encounters:   10/14/20 67 6 kg (149 lb)     Additional Vital Signs:   Date/Time  Temp  Pulse  Resp  BP  SpO2  O2 Device  Cardiac (WDL)  Patient Position - Orthostatic VS   04/03/21 1322  98 6 °F (37 °C)  78  18  140/71  97 %  None (Room air)  --  Lying   04/03/21 1247  98 8 °F (37 1 °C)  78  16  147/71  96 %  None (Room air)  --  --   04/03/21 1230  --  80  16  136/70  97 %  None (Room air)  --  --   04/03/21 1215  98 3 °F (36 8 °C)  75  16  133/65  95 %  None (Room air)  WDL  --   04/03/21 0840  --  80  --  176/60Abnormal   97 %  None (Room air)  --  Sitting   04/03/21 0700  98 9 °F (37 2 °C)  79  20  179/94Abnormal   98 %  None (Room air)  --  Lying   04/02/21 2222  98 7 °F (37 1 °C)  72  20  116/66  97 %  None (Room air)  --  Lying       Pertinent Labs/Diagnostic Test Results:       Results from last 7 days   Lab Units 04/04/21  0434 04/02/21  1705 04/02/21  0833   WBC Thousand/uL 6 35  --  5 66   HEMOGLOBIN g/dL 10 1*  --  11 6   HEMATOCRIT % 31 5*  --  36 0   PLATELETS Thousands/uL 227 287 310   NEUTROS ABS Thousands/µL 4 87  --  3 75         Results from last 7 days   Lab Units 04/04/21  0434 04/02/21  0833   SODIUM mmol/L 140 142   POTASSIUM mmol/L 4 5 4 8   CHLORIDE mmol/L 105 111*   CO2 mmol/L 28 29   ANION GAP mmol/L 7 2*   BUN mg/dL 12 21   CREATININE mg/dL 0 82 0 85   EGFR ml/min/1 73sq m 79 76   CALCIUM mg/dL 8 8 9 6         Results from last 7 days   Lab Units 04/06/21  0719 04/05/21  2200 04/05/21  1608 04/05/21  1102 04/05/21  0758 04/04/21 2011 04/04/21  1647 04/04/21  1113 04/04/21  0653 04/03/21  2103 04/03/21  1612 04/03/21  1235   POC GLUCOSE mg/dl 129 137 130 118 106 156* 129 161* 138 173* 146* 93     Results from last 7 days   Lab Units 04/04/21  0434 04/02/21  0833   GLUCOSE RANDOM mg/dL 128 101       Results from last 7 days   Lab Units 04/04/21  0434 04/02/21  0833   CRP mg/L 12 2*  --    SED RATE mm/hour 18 37*     Left foot XR 04-03-21  1   Progressive lucency surrounding the distal screw at the base of the 5th metatarsal representing either infection or loosening  2   Worsening soft tissue swelling diffusely surrounding the foot, most compatible with cellulitis     CXR 04-03-21  NAD       Past Medical History:   Diagnosis Date    Anesthesia complication     difficulty awakening    Arthritis     right hand middle finger    Cancer (HCC)     uterine- radical  hysterectomy/ chemo and radiation    Charcot's joint, left ankle and foot     Colon polyp     Diabetes mellitus (HCC)     IDDM    Hypertension     Ulcer of great toe (HCC)     right- pt changes dsg daily    Unable to bear weight     LLE- using knee scooter     Present on Admission:   Diabetes mellitus, type 2 ([facility] [address] )   HTN (hypertension)      Admitting Diagnosis: Cellulitis of foot, left [L03 116]  Age/Sex: 64 y o  female  Admission Orders:  Scheduled Medications:  amLODIPine, 5 mg, Oral, Daily  ascorbic acid, 250 mg, Oral, Daily  aspirin, 81 mg, Oral, Daily  cefazolin, 2,000 mg, Intravenous, Q8H  collagenase, , Topical, Daily  enoxaparin, 40 mg, Subcutaneous, Daily  insulin glargine, 18 Units, Subcutaneous, HS  insulin lispro, 1-5 Units, Subcutaneous, TID AC  insulin lispro, 1-5 Units, Subcutaneous, HS  losartan, 25 mg, Oral, Daily      Continuous IV Infusions:     PRN Meds:  acetaminophen, 650 mg, Oral, Q6H PRN        IP CONSULT TO INFECTIOUS DISEASES  IP CONSULT TO INTERNAL MEDICINE   Blood sugars [facility]      Network Utilization Review Department  ATTENTION: Please call with any questions or concerns to [phone] and carefully listen to the prompts so that you are directed to the right person  All voicemails are confidential   Miladys Barr all requests for admission clinical reviews, approved or denied determinations and any other requests to dedicated fax number below belonging to the campus where the patient is receiving treatment   List of dedicated fax numbers for the Facilities:  [address] DENIALS (Administrative/Medical Necessity) [phone]   [address] (Maternity/NICU/Pediatrics) [phone]   [address] [place] [address] Dr Teri Wahl 6209 (  Tova Zavala [address]) [address] [place] [address] [address] [place] [address] [phone]

## 2021-04-06 NOTE — PROGRESS NOTES
Progress Note - Podiatry  Yolanda Pierson 64 y o  female MRN: [id number]  Unit/Bed#: E2 -01 Encounter: [id number]    Assessment:  1  Left foot ulceration holman 3       - S/p I&D, removal of hardware, resection of base of 5th metatarsal base (DOS:4/4)  2  Left foot cellulitis  3  Left foot charcot neuroarthropathy s/p reconstruction  4  Right hallux wound  5  Diabetes Type 2, A1c 5 8% 7/23/2020    Plan:   Final proximal margin bone biopsy pending   Continue IV antibiotics as per ID recommendations (IV Ancef, p o  Flagyl)   Continue nonweightbearing left lower extremity, weightbear as tolerated right lower extremity   Continue elevation of the left lower extremity   Dressings intact bilateral lower extremities   Appreciate slim for medical management   Plan was discussed with Dr Charmaine Browning   Chief Complaint: No chief complaint on file  Subjective: 64 y o  female was seen and evaluated at bedside  Patient denies any acute events overnight  Blood pressure 127/63, pulse 72, temperature (!) 96 6 °F (35 9 °C), temperature source Temporal, resp  rate 18, SpO2 94 %, not currently breastfeeding  There is no height or weight on file to calculate BMI  Physical Exam:   General: Alert, cooperative and no distress  Lower Extremities: Neurovascular status at baseline bilaterally, musculoskeletal function at baseline bilaterally, no calf tenderness bilaterally           Lab, Imaging and other studies:   Results from last 7 days   Lab Units 04/04/21  0434   WBC Thousand/uL 6 35   HEMOGLOBIN g/dL 10 1*   HEMATOCRIT % 31 5*   PLATELETS Thousands/uL 227   NEUTROS PCT % 76*   LYMPHS PCT % 9*   MONOS PCT % 11   EOS PCT % 2     Results from last 7 days   Lab Units 04/04/21  0434   POTASSIUM mmol/L 4 5   CHLORIDE mmol/L 105   CO2 mmol/L 28   BUN mg/dL 12   CREATININE mg/dL 0 82   CALCIUM mg/dL 8 8                     Imaging: I have personally reviewed pertinent films and reports in PACS  EKG, Pathology, and Other Studies: I have personally reviewed pertinent reports  Portions of the record may have been created with voice recognition software  Occasional wrong word or "sound a like" substitutions may have occurred due to the inherent limitations of voice recognition software  Read the chart carefully and recognize, using context, where substitutions have occurred      La Nena Man DPM

## 2021-04-06 NOTE — ASSESSMENT & PLAN NOTE
· Left foot ulceration/cellulitis   · Management per primary team, Podiatry   · S/p I&D left foot, removal hardware, removal of infected nonviable bone and soft tissue with 5th metatarsal partial resection on 4/3/21   · Infectious Disease on board for antibiotics currently on IV ancef   · PO flagyl discontinued per podiatry  · Antibiotics, DVT prophylaxis, weight bearing status, pain control, PT/OT per primary team

## 2021-04-06 NOTE — PROGRESS NOTES
[address] [facility]  Progress Note - Yolanda Pierson 1960, 64 y o  female MRN: [id number]  Unit/Bed#: E2 -01 Encounter: [id number]  Primary Care Provider: Haydee Milligan DO   Date and time admitted to hospital: 4/2/2021  2:48 PM    * Diabetic foot infection ([facility] [address] )  Assessment & Plan  · Left foot ulceration/cellulitis   · Management per primary team, Podiatry   · S/p I&D left foot, removal hardware, removal of infected nonviable bone and soft tissue with 5th metatarsal partial resection on 4/3/21   · Infectious Disease on board for antibiotics currently on IV ancef   · PO flagyl discontinued per podiatry  · Antibiotics, DVT prophylaxis, weight bearing status, pain control, PT/OT per primary team      Diabetes mellitus, type 2 [facility])  Assessment & Plan  Lab Results   Component Value Date    HGBA1C 5 8 (H) 07/23/2020       Recent Labs     04/05/21  1102 04/05/21  1608 04/05/21  2200 04/06/21  0719   POCGLU 118 130 137 129       Blood Sugar Average: Last 72 hrs:  (P) 131 [id number]   Continue lantus 18 units qHS, SSI, Accuchecks   ADA diet     HTN (hypertension)  Assessment & Plan  · Maintained on norvasc 5 mg daily and losartan 25 mg daily   · Blood pressure monitoring per unit routine   · Most recent /63    Anemia  Assessment & Plan  Lab Results   Component Value Date    HGB 10 1 (L) 04/04/2021    HGB 11 6 04/02/2021     Monitor post operatively       Charcot's joint of left foot  Assessment & Plan  · S/p reconstruction    VTE Pharmacologic Prophylaxis:   Pharmacologic: Enoxaparin (Lovenox)  Mechanical VTE Prophylaxis in Place: Yes    Patient Centered Rounds: I have performed bedside rounds with nursing staff today  Discussions with Specialists or Other Care Team Provider: podiatry, ID    Education and Discussions with Family / Patient: discussed plan of care with patient and answered any questions  Time Spent for Care: 20 minutes    More than 50% of total time spent on counseling and coordination of care as described above  Current Length of Stay: 4 day(s)    Current Patient Status: Inpatient   Certification Statement: The patient will continue to require additional inpatient hospital stay due to further management per primary    Discharge Plan: per primary team    Code Status: Level 1 - Full Code      Subjective:   Patient states that she is feeling good today  Is very pleasant  States that she had some slight fevers yesterday that were managed with tylenol  Denies any chest pain, SOB, N/V, diarrhea, constipation  Objective:     Vitals:   Temp (24hrs), Av 7 °F (36 5 °C), Min:96 6 °F (35 9 °C), Max:99 1 °F (37 3 °C)    Temp:  [96 6 °F (35 9 °C)-99 1 °F (37 3 °C)] 96 6 °F (35 9 °C)  HR:  [72-80] 72  Resp:  [18-20] 18  BP: (127-150)/(63-82) 127/63  SpO2:  [94 %-98 %] 94 %  There is no height or weight on file to calculate BMI  Input and Output Summary (last 24 hours): Intake/Output Summary (Last 24 hours) at 2021 1048  Last data filed at 2021 1100  Gross per 24 hour   Intake 130 ml   Output --   Net 130 ml       Physical Exam:     Physical Exam  Vitals signs and nursing note reviewed  Constitutional:       General: She is not in acute distress  Appearance: Normal appearance  She is normal weight  She is not ill-appearing, toxic-appearing or diaphoretic  Comments: Sitting up in bed with left foot elevated  HENT:      Head: Normocephalic and atraumatic  Eyes:      Pupils: Pupils are equal, round, and reactive to light  Cardiovascular:      Rate and Rhythm: Normal rate and regular rhythm  Heart sounds: Normal heart sounds  No murmur  No friction rub  No gallop  Pulmonary:      Effort: Pulmonary effort is normal  No respiratory distress  Breath sounds: Normal breath sounds  No stridor  No wheezing, rhonchi or rales  Chest:      Chest wall: No tenderness  Abdominal:      General: Abdomen is flat   Bowel sounds are normal  Palpations: Abdomen is soft  Musculoskeletal:      Right lower leg: No edema  Left lower leg: No edema  Comments: Left foot wound wrapped  Without any surrounding erythema or drainage  Skin:     General: Skin is warm and dry  Capillary Refill: Capillary refill takes less than 2 seconds  Findings: No erythema or rash  Neurological:      General: No focal deficit present  Mental Status: She is alert  Mental status is at baseline  Additional Data:     Labs:    Results from last 7 days   Lab Units 04/04/21  0434   WBC Thousand/uL 6 35   HEMOGLOBIN g/dL 10 1*   HEMATOCRIT % 31 5*   PLATELETS Thousands/uL 227   NEUTROS PCT % 76*   LYMPHS PCT % 9*   MONOS PCT % 11   EOS PCT % 2     Results from last 7 days   Lab Units 04/04/21  0434   SODIUM mmol/L 140   POTASSIUM mmol/L 4 5   CHLORIDE mmol/L 105   CO2 mmol/L 28   BUN mg/dL 12   CREATININE mg/dL 0 82   ANION GAP mmol/L 7   CALCIUM mg/dL 8 8   GLUCOSE RANDOM mg/dL 128         Results from last 7 days   Lab Units 04/06/21  0719 04/05/21  2200 04/05/21  1608 04/05/21  1102 04/05/21  0758 04/04/21 2011 04/04/21  1647 04/04/21  1113 04/04/21  0653 04/03/21  2103 04/03/21  1612 04/03/21  1235   POC GLUCOSE mg/dl 129 137 130 118 106 156* 129 161* 138 173* 146* 93                   * I Have Reviewed All Lab Data Listed Above  * Additional Pertinent Lab Tests Reviewed:  [facility] Admission Reviewed    Imaging:    Imaging Reports Reviewed Today Include: none  Imaging Personally Reviewed by Myself Includes:  none    Recent Cultures (last 7 days):           Last 24 Hours Medication List:   Current Facility-Administered Medications   Medication Dose Route Frequency Provider Last Rate    acetaminophen  650 mg Oral Q6H PRN Katerin Gregory DPM      amLODIPine  5 mg Oral Daily Katerin Holloway DPM      ascorbic acid  250 mg Oral Daily Katerin Holloway [facility]      aspirin  81 mg Oral Daily [facility]      cefazolin  2,000 mg Intravenous Q8H Francis Waldrop, DPM 2,000 mg (04/06/21 1004)    collagenase   Topical Daily Francis Waldrop, DPM      enoxaparin  40 mg Subcutaneous Daily [address] [place], [place]      insulin glargine  18 Units Subcutaneous HS [address] [place], DPM      insulin lispro  1-5 Units Subcutaneous TID [address], DPM      insulin lispro  1-5 Units Subcutaneous HS [address] [place], DPM      losartan  25 mg Oral Daily Francis Waldrop DPM          Today, Patient Was Seen By: Kaushik Juarez PA-C    ** Please Note: Dictation voice to text software may have been used in the creation of this document   **

## 2021-04-07 LAB
GLUCOSE SERPL-MCNC: 129 MG/DL (ref 65–140)
GLUCOSE SERPL-MCNC: 141 MG/DL (ref 65–140)
GLUCOSE SERPL-MCNC: 158 MG/DL (ref 65–140)
GLUCOSE SERPL-MCNC: 171 MG/DL (ref 65–140)

## 2021-04-07 PROCEDURE — 82948 REAGENT STRIP/BLOOD GLUCOSE: CPT

## 2021-04-07 PROCEDURE — 99232 SBSQ HOSP IP/OBS MODERATE 35: CPT | Performed by: PHYSICIAN ASSISTANT

## 2021-04-07 PROCEDURE — 99233 SBSQ HOSP IP/OBS HIGH 50: CPT | Performed by: INTERNAL MEDICINE

## 2021-04-07 RX ORDER — FLUCONAZOLE 100 MG/1
200 TABLET ORAL ONCE
Status: COMPLETED | OUTPATIENT
Start: 2021-04-07 | End: 2021-04-07

## 2021-04-07 RX ADMIN — AMLODIPINE BESYLATE 5 MG: 5 TABLET ORAL at 08:30

## 2021-04-07 RX ADMIN — FLUCONAZOLE 200 MG: 100 TABLET ORAL at 02:50

## 2021-04-07 RX ADMIN — COLLAGENASE SANTYL: 250 OINTMENT TOPICAL at 08:30

## 2021-04-07 RX ADMIN — ASPIRIN 81 MG CHEWABLE TABLET 81 MG: 81 TABLET CHEWABLE at 08:29

## 2021-04-07 RX ADMIN — CEFAZOLIN SODIUM 2000 MG: 2 SOLUTION INTRAVENOUS at 01:16

## 2021-04-07 RX ADMIN — INSULIN LISPRO 1 UNITS: 100 INJECTION, SOLUTION INTRAVENOUS; SUBCUTANEOUS at 22:15

## 2021-04-07 RX ADMIN — OXYCODONE HYDROCHLORIDE AND ACETAMINOPHEN 250 MG: 500 TABLET ORAL at 08:30

## 2021-04-07 RX ADMIN — INSULIN LISPRO 1 UNITS: 100 INJECTION, SOLUTION INTRAVENOUS; SUBCUTANEOUS at 12:06

## 2021-04-07 RX ADMIN — LOSARTAN POTASSIUM 25 MG: 25 TABLET, FILM COATED ORAL at 08:29

## 2021-04-07 RX ADMIN — ENOXAPARIN SODIUM 40 MG: 40 INJECTION SUBCUTANEOUS at 08:29

## 2021-04-07 RX ADMIN — INSULIN GLARGINE 18 UNITS: 100 INJECTION, SOLUTION SUBCUTANEOUS at 22:15

## 2021-04-07 RX ADMIN — CEFAZOLIN SODIUM 2000 MG: 2 SOLUTION INTRAVENOUS at 08:30

## 2021-04-07 NOTE — PROGRESS NOTES
Progress Note - Infectious Disease   Heidy Lomax 64 y o  female MRN: [id number]  Unit/Bed#: E2 -01 Encounter: [id number]    Impression/Plan:  1  Infected left foot ulceration  In the setting of hardware in place with progressive lucency around the hardware suggesting the possibility of hardware infection with osteomyelitis   The patient has undergone removal of the lateral hardware although there is some hardware that remains in the medial mid foot  Unfortunately no operative cultures were obtained   She is currently receiving cefazolin and Flagyl which she is tolerating without difficulty  Will continue for now while we await pending bone pathology  Left foot swelling has significantly improved  -continue IV cefazolin  -continue oral Flagyl  -monitor CBC and BMP  -follow up pathology and if remaining osteomyelitis at the margins, additional surgical intervention will be indicated  -if margins are negative, can discontinue all antibiotics  -serial left foot exams  -local wound care per Podiatry  -continue close follow-up with Podiatry     2  Left foot cellulitis  In the setting of previous reconstruction of Charcot foot   Status postoperative intervention as above   She is following closely with Podiatry   -antibiotics as above  -serial exams  -continue close follow-up with Podiatry     3  Type 2 diabetes mellitus with long-term insulin use   Patient's last hemoglobin A1c was 5 8% on 07/23/2020   Elevated blood glucose is risk factor for wounds and infection   Recommend tight glycemic control, especially in the setting of wound and infection   -blood glucose management per SLIM    Above plan was discussed in detail with patient at the bedside  Above plan was discussed in detail with podiatry resident, Dr Digna Gregorio  Antibiotics:  Cefazolin 6  Flagyl 6  Antibiotics 6  Post op #4    Subjective:  Patient reports she feels great today  Is just about to get in the shower   Is very pleased with the appearance of her foot, feels the swelling is the best its been in a while  She has no fever, chills, sweats, shakes; no nausea, vomiting, abdominal pain, diarrhea, or dysuria; no cough, shortness of breath, or chest pain  No new symptoms  Objective:  Vitals:  Temp:  [97 4 °F (36 3 °C)-97 5 °F (36 4 °C)] 97 5 °F (36 4 °C)  HR:  [76-82] 76  Resp:  [18] 18  BP: (130-147)/(61-70) 130/61  SpO2:  [97 %] 97 %  Temp (24hrs), Av 5 °F (36 4 °C), Min:97 4 °F (36 3 °C), Max:97 5 °F (36 4 °C)  Current: Temperature: 97 5 °F (36 4 °C)    Physical Exam:   General Appearance:  Alert, interactive, nontoxic, no acute distress  She appears comfortable sitting up in bed  Is wearing her PJs  Lungs:   Respirations unlabored, she is on room air  Heart:  RRR; no murmur, rub or gallop  Abdomen:   Soft, non-tender, non-distended  Extremities: No clubbing or cyanosis of RLE; L foot bulky dressing is clean/dry/intact without breakthrough drainage, no spreading erythema from bandage site  Skin: No new rashes noted on exposed skin       Labs, Imaging, & Other studies:   All pertinent labs and imaging studies were personally reviewed  Results from last 7 days   Lab Units 21  0434 21  1705 21  0833   WBC Thousand/uL 6 35  --  5 66   HEMOGLOBIN g/dL 10 1*  --  11 6   PLATELETS Thousands/uL 227 287 310     Results from last 7 days   Lab Units 21  0434   CRP mg/L 12 2*

## 2021-04-07 NOTE — ASSESSMENT & PLAN NOTE
Lab Results   Component Value Date    HGBA1C 5 8 (H) 07/23/2020       Recent Labs     04/06/21  1559 04/06/21  2120 04/07/21  0619 04/07/21  1124   POCGLU 158* 185* 129 171*       Blood Sugar Average: Last 72 hrs:  (P) 142 5   Continue lantus 18 units qHS, SSI, Accuchecks   ADA diet

## 2021-04-07 NOTE — PROGRESS NOTES
Kurtis [facility]  Progress Note - An Schroeder 1960, 64 y o  female MRN: [id number]  Unit/Bed#: E2 -01 Encounter: [id number]  Primary Care Provider: Gay Strauss DO   Date and time admitted to hospital: 4/2/2021  2:48 PM    * Diabetic foot infection ([address] )  Assessment & Plan  · Left foot ulceration/cellulitis   · Management per primary team, Podiatry   · S/p I&D left foot, removal hardware, removal of infected nonviable bone and soft tissue with 5th metatarsal partial resection on 4/3/21   · Infectious Disease on board for antibiotics currently on IV ancef   · PO flagyl discontinued per podiatry  · Antibiotics, DVT prophylaxis, weight bearing status, pain control, PT/OT per primary team      Diabetes mellitus, type 2 [facility])  Assessment & Plan  Lab Results   Component Value Date    HGBA1C 5 8 (H) 07/23/2020       Recent Labs     04/06/21  1559 04/06/21  2120 04/07/21  0619 04/07/21  1124   POCGLU 158* 185* 129 171*       Blood Sugar Average: Last 72 hrs:  (P) 142 5   Continue lantus 18 units qHS, SSI, Accuchecks   ADA diet     HTN (hypertension)  Assessment & Plan  · Maintained on norvasc 5 mg daily and losartan 25 mg daily   · Blood pressure monitoring per unit routine   · Most recent /83    Anemia  Assessment & Plan  Lab Results   Component Value Date    HGB 10 1 (L) 04/04/2021    HGB 11 6 04/02/2021     Monitor post operatively       Charcot's joint of left foot  Assessment & Plan  · S/p reconstruction      VTE Pharmacologic Prophylaxis:   Pharmacologic: Enoxaparin (Lovenox)  Mechanical VTE Prophylaxis in Place: Yes    Patient Centered Rounds: I have performed bedside rounds with nursing staff today  Discussions with Specialists or Other Care Team Provider: podiatry, ID    Education and Discussions with Family / Patient:  Discussed plan of care with patient, answered any questions  Time Spent for Care: 20 minutes    More than 50% of total time spent on counseling and coordination of care as described above  Current Length of Stay: 5 day(s)    Current Patient Status: Inpatient   Certification Statement: The patient will continue to require additional inpatient hospital stay due to Management per primary team    Discharge Plan:  Per primary team    Code Status: Level 1 - Full Code      Subjective:   Patient states that she is feeling better today  States that she had some issues with her left IV site last evening, however it is since resolved  Denies any fevers/chills, chest pain, shortness of breath, nausea, vomiting, diarrhea, constipation  States that her foot pain is getting better  Objective:     Vitals:   Temp (24hrs), Av 5 °F (36 4 °C), Min:97 4 °F (36 3 °C), Max:97 5 °F (36 4 °C)    Temp:  [97 4 °F (36 3 °C)-97 5 °F (36 4 °C)] 97 5 °F (36 4 °C)  HR:  [76-82] 76  Resp:  [18] 18  BP: (130-166)/(61-83) 166/83  SpO2:  [97 %-99 %] 99 %  Body mass index is 27 25 kg/m²  Input and Output Summary (last 24 hours): Intake/Output Summary (Last 24 hours) at 2021 1247  Last data filed at 2021 0140  Gross per 24 hour   Intake 50 ml   Output --   Net 50 ml       Physical Exam:     Physical Exam  Vitals signs and nursing note reviewed  Constitutional:       General: She is not in acute distress  Appearance: Normal appearance  She is normal weight  She is not ill-appearing, toxic-appearing or diaphoretic  Comments: Patient resting comfortably in bed with left leg elevated  Eyes:      Pupils: Pupils are equal, round, and reactive to light  Cardiovascular:      Rate and Rhythm: Normal rate and regular rhythm  Pulses: Normal pulses  Heart sounds: Normal heart sounds  No murmur  No friction rub  No gallop  Pulmonary:      Effort: Pulmonary effort is normal  No respiratory distress  Breath sounds: Normal breath sounds  No stridor  No wheezing, rhonchi or rales  Chest:      Chest wall: No tenderness     Abdominal:      General: Abdomen is flat  Bowel sounds are normal       Palpations: Abdomen is soft  Tenderness: There is no abdominal tenderness  Musculoskeletal:         General: Deformity present  No swelling  Right lower leg: No edema  Left lower leg: No edema  Comments: Left foot with bandage in place, no erythema, no drainage  Skin:     General: Skin is warm and dry  Capillary Refill: Capillary refill takes less than 2 seconds  Coloration: Skin is not jaundiced  Findings: No erythema  Neurological:      General: No focal deficit present  Mental Status: She is alert  Mental status is at baseline  Additional Data:     Labs:    Results from last 7 days   Lab Units 04/04/21  0434   WBC Thousand/uL 6 35   HEMOGLOBIN g/dL 10 1*   HEMATOCRIT % 31 5*   PLATELETS Thousands/uL 227   NEUTROS PCT % 76*   LYMPHS PCT % 9*   MONOS PCT % 11   EOS PCT % 2     Results from last 7 days   Lab Units 04/04/21  0434   SODIUM mmol/L 140   POTASSIUM mmol/L 4 5   CHLORIDE mmol/L 105   CO2 mmol/L 28   BUN mg/dL 12   CREATININE mg/dL 0 82   ANION GAP mmol/L 7   CALCIUM mg/dL 8 8   GLUCOSE RANDOM mg/dL 128         Results from last 7 days   Lab Units 04/07/21  1124 04/07/21  0619 04/06/21  2120 04/06/21  1559 04/06/21  1145 04/06/21  0719 04/05/21  2200 04/05/21  1608 04/05/21  1102 04/05/21  0758 04/04/21 2011 04/04/21  1647   POC GLUCOSE mg/dl 171* 129 185* 158* 148* 129 137 130 118 106 156* 129                   * I Have Reviewed All Lab Data Listed Above  * Additional Pertinent Lab Tests Reviewed:  [address] Admission Reviewed    Imaging:    Imaging Reports Reviewed Today Include: none  Imaging Personally Reviewed by Myself Includes:  None    Recent Cultures (last 7 days):           Last 24 Hours Medication List:   Current Facility-Administered Medications   Medication Dose Route Frequency Provider Last Rate    acetaminophen  650 mg Oral Q6H PRN Gomezshelbie Gutierrez Pelt, DPM      amLODIPine 5 mg Oral Daily [address] [facility]      ascorbic acid  250 mg Oral Daily [address] [facility]      aspirin  81 mg Oral Daily [address] [facility]      cefazolin  2,000 mg Intravenous [address] [place], DPM 2,000 mg (04/07/21 0830)    collagenase   Topical Daily [place], DPM      enoxaparin  40 mg Subcutaneous Daily [address] [facility]      insulin glargine  18 Units Subcutaneous HS [address] [place], DPM      insulin lispro  1-5 Units Subcutaneous TID [facility], DPM      insulin lispro  1-5 Units Subcutaneous HS [address] [place], DPM      losartan  25 mg Oral Daily [place], DPM          Today, Patient Was Seen By: Cher Tran PA-C    ** Please Note: Dictation voice to text software may have been used in the creation of this document   **

## 2021-04-07 NOTE — ASSESSMENT & PLAN NOTE
· Maintained on norvasc 5 mg daily and losartan 25 mg daily   · Blood pressure monitoring per unit routine   · Most recent /83

## 2021-04-07 NOTE — PROGRESS NOTES
[facility] - Progress Note  Patient: Cecelia Nicole 64 y o  female   MRN: [id number]  PCP: William Rivers DO  Unit/Bed#: E2 -01 Encounter: [id number]  Date Of Visit: 21    ASSESSMENT:    Cecelia Nicole is a 64 y o  female with:    1  Left foot ulceration holman 3       - S/p I&D, removal of hardware, resection of base of 5th metatarsal base (DOS:4/4)  2  Left foot cellulitis  3  Left foot charcot neuroarthropathy s/p reconstruction  4  Right hallux wound  5  Diabetes Type 2, A1c 5 8% 2020      PLAN:    · Clean margin pathology pending anticipate another 2-3 days prior to final result  · Continue IV ancef per ID recommendations  · Continue NWB on LLE and WBAT to RLE  · Continue to encourage elevation of the LLE  · Appreciate SLIM medical recommendations         SUBJECTIVE:     The patient was seen, evaluated, and assessed at bedside today  The patient was awake, alert, and in no acute distress  No acute events overnight  The patient reports She is keeping foot elevated at all times  She has no pain in the foot  She reports she is very happy with her care at [facility] Patient denies N/V/F/chills/SOB/CP  OBJECTIVE:     Vitals:   /61 (BP Location: Right arm)   Pulse 76   Temp 97 5 °F (36 4 °C) (Tympanic)   Resp 18   Ht 5' 2" (1 575 m)   Wt 67 6 kg (149 lb)   SpO2 97%   BMI 27 25 kg/m²     Temp (24hrs), Av 5 °F (36 4 °C), Min:97 4 °F (36 3 °C), Max:97 5 °F (36 4 °C)      Physical Exam :     General:  Alert, cooperative, and in no distress  Lower extremity exam:  Cardiovascular status at baseline  Neurological status at baseline  Musculoskeletal status at baseline  No calf tenderness noted bilaterally  Incision well coapted with sutures intact of all incisions  Mild sanguinous drainage noted on dressing with none visualized from incisions with dressing change today  No surrounding erythema or purulent drainage noted         Clinical Images 21:    Left foot posterior heel    Left lateral foot      Additional Data:     Labs:    Results from last 7 days   Lab Units 04/04/21  0434   WBC Thousand/uL 6 35   HEMOGLOBIN g/dL 10 1*   HEMATOCRIT % 31 5*   PLATELETS Thousands/uL 227   NEUTROS PCT % 76*   LYMPHS PCT % 9*   MONOS PCT % 11   EOS PCT % 2     Results from last 7 days   Lab Units 04/04/21  0434   POTASSIUM mmol/L 4 5   CHLORIDE mmol/L 105   CO2 mmol/L 28   BUN mg/dL 12   CREATININE mg/dL 0 82   CALCIUM mg/dL 8 8           * I Have Reviewed All Lab Data Listed Above  Recent Cultures (last 7 days):               Imaging: I have personally reviewed pertinent films in PACS  Pathology, and Other Studies: I have personally reviewed pertinent reports  ** Please Note: Portions of the record may have been created with voice recognition software  Occasional wrong word or "sound a like" substitutions may have occurred due to the inherent limitations of voice recognition software  Read the chart carefully and recognize, using context, where substitutions have occurred   **

## 2021-04-08 VITALS
WEIGHT: 149 LBS | HEIGHT: 62 IN | RESPIRATION RATE: 20 BRPM | OXYGEN SATURATION: 98 % | BODY MASS INDEX: 27.42 KG/M2 | DIASTOLIC BLOOD PRESSURE: 69 MMHG | SYSTOLIC BLOOD PRESSURE: 130 MMHG | HEART RATE: 74 BPM | TEMPERATURE: 97.3 F

## 2021-04-08 PROBLEM — L08.9 DIABETIC FOOT INFECTION (HCC): Status: RESOLVED | Noted: 2021-04-02 | Resolved: 2021-04-08

## 2021-04-08 PROBLEM — E11.628 DIABETIC FOOT INFECTION (HCC): Status: RESOLVED | Noted: 2021-04-02 | Resolved: 2021-04-08

## 2021-04-08 LAB
ANION GAP SERPL CALCULATED.3IONS-SCNC: 8 MMOL/L (ref 4–13)
BASOPHILS # BLD AUTO: 0.04 THOUSANDS/ΜL (ref 0–0.1)
BASOPHILS NFR BLD AUTO: 1 % (ref 0–1)
BUN SERPL-MCNC: 18 MG/DL (ref 5–25)
CALCIUM SERPL-MCNC: 9.7 MG/DL (ref 8.3–10.1)
CHLORIDE SERPL-SCNC: 104 MMOL/L (ref 100–108)
CO2 SERPL-SCNC: 29 MMOL/L (ref 21–32)
CREAT SERPL-MCNC: 0.64 MG/DL (ref 0.6–1.3)
EOSINOPHIL # BLD AUTO: 0.34 THOUSAND/ΜL (ref 0–0.61)
EOSINOPHIL NFR BLD AUTO: 6 % (ref 0–6)
ERYTHROCYTE [DISTWIDTH] IN BLOOD BY AUTOMATED COUNT: 13.2 % (ref 11.6–15.1)
GFR SERPL CREATININE-BSD FRML MDRD: 99 ML/MIN/1.73SQ M
GLUCOSE SERPL-MCNC: 116 MG/DL (ref 65–140)
GLUCOSE SERPL-MCNC: 132 MG/DL (ref 65–140)
GLUCOSE SERPL-MCNC: 135 MG/DL (ref 65–140)
HCT VFR BLD AUTO: 38.9 % (ref 34.8–46.1)
HGB BLD-MCNC: 12.4 G/DL (ref 11.5–15.4)
IMM GRANULOCYTES # BLD AUTO: 0.02 THOUSAND/UL (ref 0–0.2)
IMM GRANULOCYTES NFR BLD AUTO: 0 % (ref 0–2)
LYMPHOCYTES # BLD AUTO: 1.3 THOUSANDS/ΜL (ref 0.6–4.47)
LYMPHOCYTES NFR BLD AUTO: 24 % (ref 14–44)
MCH RBC QN AUTO: 28.3 PG (ref 26.8–34.3)
MCHC RBC AUTO-ENTMCNC: 31.9 G/DL (ref 31.4–37.4)
MCV RBC AUTO: 89 FL (ref 82–98)
MONOCYTES # BLD AUTO: 0.39 THOUSAND/ΜL (ref 0.17–1.22)
MONOCYTES NFR BLD AUTO: 7 % (ref 4–12)
NEUTROPHILS # BLD AUTO: 3.34 THOUSANDS/ΜL (ref 1.85–7.62)
NEUTS SEG NFR BLD AUTO: 62 % (ref 43–75)
NRBC BLD AUTO-RTO: 0 /100 WBCS
PLATELET # BLD AUTO: 366 THOUSANDS/UL (ref 149–390)
PMV BLD AUTO: 9.7 FL (ref 8.9–12.7)
POTASSIUM SERPL-SCNC: 4.5 MMOL/L (ref 3.5–5.3)
RBC # BLD AUTO: 4.38 MILLION/UL (ref 3.81–5.12)
SODIUM SERPL-SCNC: 141 MMOL/L (ref 136–145)
WBC # BLD AUTO: 5.43 THOUSAND/UL (ref 4.31–10.16)

## 2021-04-08 PROCEDURE — 82948 REAGENT STRIP/BLOOD GLUCOSE: CPT

## 2021-04-08 PROCEDURE — 85025 COMPLETE CBC W/AUTO DIFF WBC: CPT | Performed by: PHYSICIAN ASSISTANT

## 2021-04-08 PROCEDURE — 80048 BASIC METABOLIC PNL TOTAL CA: CPT | Performed by: PHYSICIAN ASSISTANT

## 2021-04-08 PROCEDURE — 99232 SBSQ HOSP IP/OBS MODERATE 35: CPT | Performed by: PHYSICIAN ASSISTANT

## 2021-04-08 RX ORDER — CEPHALEXIN 500 MG/1
500 CAPSULE ORAL EVERY 6 HOURS SCHEDULED
Status: DISCONTINUED | OUTPATIENT
Start: 2021-04-08 | End: 2021-04-08 | Stop reason: HOSPADM

## 2021-04-08 RX ORDER — FLUCONAZOLE 200 MG/1
200 TABLET ORAL DAILY
Qty: 1 TABLET | Refills: 0 | Status: SHIPPED | OUTPATIENT
Start: 2021-04-08 | End: 2021-04-09

## 2021-04-08 RX ORDER — CEPHALEXIN 500 MG/1
500 CAPSULE ORAL EVERY 6 HOURS SCHEDULED
Qty: 28 CAPSULE | Refills: 0 | Status: SHIPPED | OUTPATIENT
Start: 2021-04-08 | End: 2021-04-15

## 2021-04-08 RX ADMIN — COLLAGENASE SANTYL: 250 OINTMENT TOPICAL at 09:01

## 2021-04-08 RX ADMIN — LOSARTAN POTASSIUM 25 MG: 25 TABLET, FILM COATED ORAL at 08:59

## 2021-04-08 RX ADMIN — AMLODIPINE BESYLATE 5 MG: 5 TABLET ORAL at 09:00

## 2021-04-08 RX ADMIN — CEPHALEXIN 500 MG: 500 CAPSULE ORAL at 08:58

## 2021-04-08 RX ADMIN — OXYCODONE HYDROCHLORIDE AND ACETAMINOPHEN 250 MG: 500 TABLET ORAL at 08:59

## 2021-04-08 RX ADMIN — ENOXAPARIN SODIUM 40 MG: 40 INJECTION SUBCUTANEOUS at 08:58

## 2021-04-08 RX ADMIN — ASPIRIN 81 MG CHEWABLE TABLET 81 MG: 81 TABLET CHEWABLE at 08:59

## 2021-04-08 NOTE — ASSESSMENT & PLAN NOTE
· Left foot ulceration/cellulitis   · S/p I&D left foot, removal hardware, removal of infected nonviable bone and soft tissue with 5th metatarsal partial resection on 4/3/21   · Abx per Infectious Disease- will be discharged on oral keflex  · PO flagyl discontinued per podiatry  · DVT prophylaxis, weight bearing status, pain control, PT/OT per primary team

## 2021-04-08 NOTE — ASSESSMENT & PLAN NOTE
Lab Results   Component Value Date    HGB 12 4 04/08/2021    HGB 10 1 (L) 04/04/2021    HGB 11 6 04/02/2021   · Hgb stable postoperatively

## 2021-04-08 NOTE — ASSESSMENT & PLAN NOTE
· S/p hysterectomy, chemo, and radiation  · Prone to frequent yeast infections while on abx  · Will prescribe dose of diflucan

## 2021-04-08 NOTE — DISCHARGE SUMMARY
Discharge Summary -   Cecelia Milburn 64 y o  female MRN: [id number]  Unit/Bed#: E2 -01 Encounter: [id number]    Admission Date: 4/2/2021     Admitting Diagnosis: Cellulitis of foot, left [L03 [id number]]    HPI: she was admitted  4/2 with worsening left foot ulceration with drainage and concern for osteomyelitis  She states that approximately 1 week ago 03/27/2021 she began noticing itching sensation of left lateral foot  After scratching this next day 3/28 her  noticed that there was an opening on her left foot  She began to notice blood draining from the wound and redness surrounding the new ulcer  She was seen in the office by Dyana Leyden and prescribed Bactrim she did not have significant improvement of wound or surrounding redness and was seen again in the office today by Dr Camargo  Radiographs were again reviewed by Dr John La and there was concern for loosening of hardware and was discussed that she should go to the hospital for surgical workup    Procedures Performed: Shad [address] (I&D) FOOT; Removal of hardware; Removal of infected nonviable deformed bone and soft tissue with fifth metatarsal partial resection:     Hospital Course: She underwent removal of hardware with bone cultures and pathology sent on 4/3  She had IV Ancef and flagyl during her hospital which was well tolerated  Her clean margin sample did not show pathologic osteomyelitis  She was discharged on po keflex to be taken through 4/14  Significant Findings, Care, Treatment and Services Provided: clean margin of 5th metatarsal was clear of osteomyelitis  hydroset was utilized with vancomycin and tobramycin in previous hardware locations    Complications: none    Discharge Diagnosis: cellulitis and removal of hardware    Condition at Discharge: good     Discharge instructions/Information to patient and family:   See after visit summary for information provided to patient and family        Provisions for Follow-Up Care/Important appointments: Will follow up with Dr Chey Prado in one week and is to take po antibiotics as prescribed  NWB to LLE    See after visit summary for information related to follow-up care and any pertinent home health orders  Disposition: Home    Planned Readmission: No    Discharge Statement   I spent 30 minutes discharging the patient  This time was spent on the day of discharge  I had direct contact with the patient on the day of discharge  The details of this patient's discharge     Discharge Medications:  See after visit summary for reconciled discharge medications provided to patient and family

## 2021-04-08 NOTE — ASSESSMENT & PLAN NOTE
· Maintained on norvasc 5 mg daily and losartan 25 mg daily   · Blood pressure monitoring per unit routine   · /85

## 2021-04-08 NOTE — DISCHARGE INSTRUCTIONS
Discharge Instructions - Podiatry    Weight Bearing Status: Non-weight bearing to left foot                    Pain: Continue analgesics as directed    Follow-up appointment instructions: Please make an appointment within one week of discharge with Dr Macrina Dockery  Contact sooner if any increase in pain, or signs of infection occur    Wound Care: Leave dressings clean, dry, and intact between professional dressing changes    Nursing Instructions: Please apply Santyl and xeroform to the right big toe  Then cover with Gauze and secure with Honorio and tape  Please change dressing every day

## 2021-04-08 NOTE — PROGRESS NOTES
[address] [facility]  Progress Note - Yolanda Pierson 1960, 64 y o  female MRN: [id number]  Unit/Bed#: E2 -01 Encounter: [id number]  Primary Care Provider: Haydee Milligan DO   Date and time admitted to hospital: 4/2/2021  2:48 PM    * Diabetic foot infection ([address] )  Assessment & Plan  · Left foot ulceration/cellulitis   · S/p I&D left foot, removal hardware, removal of infected nonviable bone and soft tissue with 5th metatarsal partial resection on 4/3/21   · Abx per Infectious Disease- will be discharged on oral keflex  · PO flagyl discontinued per podiatry  · DVT prophylaxis, weight bearing status, pain control, PT/OT per primary team    Anemia  Assessment & Plan  Lab Results   Component Value Date    HGB 12 4 04/08/2021    HGB 10 1 (L) 04/04/2021    HGB 11 6 04/02/2021   · Hgb stable postoperatively    Charcot's joint of left foot  Assessment & Plan  · S/p reconstruction    Diabetes mellitus, type 2 [facility])  Assessment & Plan  Lab Results   Component Value Date    HGBA1C 5 8 (H) 07/23/2020     Recent Labs     04/07/21  1124 04/07/21  1611 04/07/21  2209 04/08/21  0731   POCGLU 171* 141* 158* 116   · Continue lantus 18 units qHS, SSI, Accuchecks   · ADA diet    Uterine cancer (HCC)  Assessment & Plan  · S/p hysterectomy, chemo, and radiation  · Prone to frequent yeast infections while on abx  · Will prescribe dose of diflucan    HTN (hypertension)  Assessment & Plan  · Maintained on norvasc 5 mg daily and losartan 25 mg daily   · Blood pressure monitoring per unit routine   · /85    VTE Pharmacologic Prophylaxis:   Pharmacologic: Enoxaparin (Lovenox)  Mechanical VTE Prophylaxis in Place: Yes    Discharge Plan: per primary team    Discussions with Specialists or Other Care Team Provider: nursing    Education and Discussions with Family / Patient: patient    Time Spent for Care: 20 minutes    More than 50% of total time spent on counseling and coordination of care as described above     Current Length of Stay: 6 day(s)  Current Patient Status: Inpatient   Code Status: Level 1 - Full Code    Subjective:   Pt resting in bed  Complains of a vaginal yeast infection  This is typical with her while on abx with her hx of cancer  Will give dose of diflucan  Objective:     Vitals:   Temp (24hrs), Av 3 °F (36 8 °C), Min:98 1 °F (36 7 °C), Max:98 5 °F (36 9 °C)    Temp:  [98 1 °F (36 7 °C)-98 5 °F (36 9 °C)] 98 5 °F (36 9 °C)  HR:  [77-89] 77  Resp:  [18-20] 19  BP: (148-165)/(70-93) 148/85  SpO2:  [97 %-99 %] 99 %  Body mass index is 27 25 kg/m²  Input and Output Summary (last 24 hours):     No intake or output data in the 24 hours ending 21 1050    Physical Exam:     Physical Exam  Vitals signs and nursing note reviewed  Constitutional:       General: She is not in acute distress  Appearance: Normal appearance  She is obese  She is not ill-appearing, toxic-appearing or diaphoretic  HENT:      Head: Normocephalic and atraumatic  Eyes:      General: No scleral icterus  Cardiovascular:      Rate and Rhythm: Normal rate and regular rhythm  Pulmonary:      Effort: Pulmonary effort is normal  No respiratory distress  Breath sounds: Normal breath sounds  No stridor  No wheezing or rhonchi  Abdominal:      General: Bowel sounds are normal  There is no distension  Palpations: Abdomen is soft  There is no mass  Tenderness: There is no abdominal tenderness  Hernia: No hernia is present  Musculoskeletal:         General: No swelling  Comments: Left foot gauze wrapped   Skin:     General: Skin is warm and dry  Coloration: Skin is not jaundiced  Neurological:      Mental Status: She is oriented to person, place, and time  Mental status is at baseline     Psychiatric:         Mood and Affect: Mood normal          Behavior: Behavior normal          Additional Data:     Labs:    Results from last 7 days   Lab Units 21  0608   WBC Thousand/uL 5  43   HEMOGLOBIN g/dL 12 4   HEMATOCRIT % 38 9   PLATELETS Thousands/uL 366   NEUTROS PCT % 62   LYMPHS PCT % 24   MONOS PCT % 7   EOS PCT % 6     Results from last 7 days   Lab Units 04/08/21  0608   POTASSIUM mmol/L 4 5   CHLORIDE mmol/L 104   CO2 mmol/L 29   BUN mg/dL 18   CREATININE mg/dL 0 64   CALCIUM mg/dL 9 7           * I Have Reviewed All Lab Data Listed Above  * Additional Pertinent Lab Tests Reviewed: [address] Admission Reviewed    Imaging:    Imaging Reports Reviewed Today Include:   Imaging Personally Reviewed by Myself Includes:      Recent Cultures (last 7 days):           Last 24 Hours Medication List:   Current Facility-Administered Medications   Medication Dose Route Frequency Provider Last Rate    acetaminophen  650 mg Oral Q6H PRN Aiden Robles DPM      amLODIPine  5 mg Oral Daily [address] [place], DPM      ascorbic acid  250 mg Oral Daily [address] [place], DPM      aspirin  81 mg Oral Daily [address] [facility] [place]      cephalexin  500 mg Oral Q6H [address] Karl Wal[place], DPVASYL      collagenase   Topical Daily [address] [place], DPM      enoxaparin  40 mg Subcutaneous Daily [address] [facility] [place]      insulin glargine  18 Units Subcutaneous HS Aiden Robles DPM      insulin lispro  1-5 Units Subcutaneous TID AC Aiden Robles DPVASYL      insulin lispro  1-5 Units Subcutaneous HS Marlen Seth, DPM      losartan  25 mg Oral Daily Aiden Robles DPM          Today, Patient Was Seen By: Katelin Lyle PA-C    ** Please Note: This note has been constructed using a voice recognition system   **

## 2021-04-08 NOTE — PLAN OF CARE
Problem: Potential for Falls  Goal: Patient will remain free of falls  Description: INTERVENTIONS:  - Assess patient frequently for physical needs  -  Identify cognitive and physical deficits and behaviors that affect risk of falls  -  [place] fall precautions as indicated by assessment   - Educate patient/family on patient safety including physical limitations  - Instruct patient to call for assistance with activity based on assessment  - Modify environment to reduce risk of injury  - Consider OT/PT consult to assist with strengthening/mobility  Outcome: Progressing     Problem: METABOLIC, FLUID AND ELECTROLYTES - ADULT  Goal: Glucose maintained within target range  Description: INTERVENTIONS:  - Monitor Blood Glucose as ordered  - Assess for signs and symptoms of hyperglycemia and hypoglycemia  - Administer ordered medications to maintain glucose within target range  - Assess nutritional intake and initiate nutrition service referral as needed  Outcome: Progressing     Problem: PAIN - ADULT  Goal: Verbalizes/displays adequate comfort level or baseline comfort level  Description: Interventions:  - Encourage patient to monitor pain and request assistance  - Assess pain using appropriate pain scale  - Administer analgesics based on type and severity of pain and evaluate response  - Implement non-pharmacological measures as appropriate and evaluate response  - Consider cultural and social influences on pain and pain management  - Notify physician/advanced practitioner if interventions unsuccessful or patient reports new pain  Outcome: Progressing     Problem: SAFETY ADULT  Goal: Patient will remain free of falls  Description: INTERVENTIONS:  - Assess patient frequently for physical needs  -  Identify cognitive and physical deficits and behaviors that affect risk of falls    -  [place] fall precautions as indicated by assessment   - Educate patient/family on patient safety including physical limitations  - Instruct patient to call for assistance with activity based on assessment  - Modify environment to reduce risk of injury  - Consider OT/PT consult to assist with strengthening/mobility  Outcome: Progressing  Goal: Maintain or return to baseline ADL function  Description: INTERVENTIONS:  -  Assess patient's ability to carry out ADLs; assess patient's baseline for ADL function and identify physical deficits which impact ability to perform ADLs (bathing, care of mouth/teeth, toileting, grooming, dressing, etc )  - Assess/evaluate cause of self-care deficits   - Assess range of motion  - Assess patient's mobility; develop plan if impaired  - Assess patient's need for assistive devices and provide as appropriate  - Encourage maximum independence but intervene and supervise when necessary  - Involve family in performance of ADLs  - Assess for home care needs following discharge   - Consider OT consult to assist with ADL evaluation and planning for discharge  - Provide patient education as appropriate  Outcome: Progressing  Goal: Maintain or return mobility status to optimal level  Description: INTERVENTIONS:  - Assess patient's baseline mobility status (ambulation, transfers, stairs, etc )    - Identify cognitive and physical deficits and behaviors that affect mobility  - Identify mobility aids required to assist with transfers and/or ambulation (gait belt, sit-to-stand, lift, walker, cane, etc )  - [place] fall precautions as indicated by assessment  - Record patient progress and toleration of activity level on Mobility SBAR; progress patient to next Phase/Stage  - Instruct patient to call for assistance with activity based on assessment  - Consider rehabilitation consult to assist with strengthening/weightbearing, etc   Outcome: Progressing     Problem: DISCHARGE PLANNING  Goal: Discharge to home or other facility with appropriate resources  Description: INTERVENTIONS:  - Identify barriers to discharge w/patient and caregiver  - Arrange for needed discharge resources and transportation as appropriate  - Identify discharge learning needs (meds, wound care, etc )  - Arrange for interpretive services to assist at discharge as needed  - Refer to Case Management Department for coordinating discharge planning if the patient needs post-hospital services based on physician/advanced practitioner order or complex needs related to functional status, cognitive ability, or social support system  Outcome: Progressing     Problem: Knowledge Deficit  Goal: Patient/family/caregiver demonstrates understanding of disease process, treatment plan, medications, and discharge instructions  Description: Complete learning assessment and assess knowledge base    Interventions:  - Provide teaching at level of understanding  - Provide teaching via preferred learning methods  Outcome: Progressing

## 2021-04-08 NOTE — PROGRESS NOTES
[facility] - Progress Note  Patient: Elie Meredith 64 y o  female   MRN: [id number]  PCP: Damion Castro DO  Unit/Bed#: E2 -01 Encounter: [id number]  Date Of Visit: 21    ASSESSMENT:    Elie Meredith is a 64 y o  female with:    1  Left foot ulceration holman 3       - S/p I&D, removal of hardware, resection of base of 5th metatarsal base (DOS:44)  2  Left foot cellulitis  3  Left foot charcot neuroarthropathy s/p reconstruction  4  Right hallux wound  5  Diabetes Type 2, A1c 5 8% 2020      PLAN:    · Clean margin returned without signs of osteomyelitis  · Will continue po antibiotics on discharge for 7 days as per attending preference  · Continue NWB to LLE  · Low risk for DVT at this time  Encouraged ROM of left ankle on discharge  · Will defer to SLIM recommendations for vaginal candadiasis         SUBJECTIVE:     The patient was seen, evaluated, and assessed at bedside today  The patient was awake, alert, and in no acute distress  No acute events overnight  The patient reports no pain  She has been moving her ankle through ROM  She is comfortable with changing her right foot dressing herself at home  Patient denies N/V/F/chills/SOB/CP  OBJECTIVE:     Vitals:   /85 (BP Location: Right arm)   Pulse 77   Temp 98 5 °F (36 9 °C) (Temporal)   Resp 19   Ht 5' 2" (1 575 m)   Wt 67 6 kg (149 lb)   SpO2 99%   BMI 27 25 kg/m²     Temp (24hrs), Av 3 °F (36 8 °C), Min:98 1 °F (36 7 °C), Max:98 5 °F (36 9 °C)      Physical Exam :     General:  Alert, cooperative, and in no distress  Lower extremity exam:  Cardiovascular status at baseline  Neurological status at baseline  Musculoskeletal status at baseline  No calf tenderness noted bilaterally  Left dressing left clean dry and intact  No strikethrough    Right hallux wound shows no erythema or drainage  Wound base 100% granular at this time         Clinical Images 21:          Additional Data:     Labs:    Results from last 7 days   Lab Units 04/08/21  0608   WBC Thousand/uL 5 43   HEMOGLOBIN g/dL 12 4   HEMATOCRIT % 38 9   PLATELETS Thousands/uL 366   NEUTROS PCT % 62   LYMPHS PCT % 24   MONOS PCT % 7   EOS PCT % 6     Results from last 7 days   Lab Units 04/08/21  0608   POTASSIUM mmol/L 4 5   CHLORIDE mmol/L 104   CO2 mmol/L 29   BUN mg/dL 18   CREATININE mg/dL 0 64   CALCIUM mg/dL 9 7           * I Have Reviewed All Lab Data Listed Above  Recent Cultures (last 7 days):               Imaging: I have personally reviewed pertinent films in PACS  Pathology, and Other Studies: I have personally reviewed pertinent reports  ** Please Note: Portions of the record may have been created with voice recognition software  Occasional wrong word or "sound a like" substitutions may have occurred due to the inherent limitations of voice recognition software  Read the chart carefully and recognize, using context, where substitutions have occurred   **

## 2021-04-08 NOTE — CASE MANAGEMENT
Discharge planning     CM was notified at morning rounds that pt will be discharging today  Pt will be returning to her previous environment with no anticipated CM needs  Transportation: Pt transportation will be given by pt's son  CM department will continue to follow through pt's discharge

## 2021-04-08 NOTE — ASSESSMENT & PLAN NOTE
Lab Results   Component Value Date    HGBA1C 5 8 (H) 07/23/2020     Recent Labs     04/07/21  1124 04/07/21  1611 04/07/21  2209 04/08/21  0731   POCGLU 171* 141* 158* 116   · Continue lantus 18 units qHS, SSI, Accuchecks   · ADA diet

## 2023-04-27 ENCOUNTER — OFFICE VISIT (OUTPATIENT)
Dept: DENTISTRY | Facility: CLINIC | Age: 63
End: 2023-04-27

## 2023-04-27 VITALS — HEART RATE: 82 BPM | DIASTOLIC BLOOD PRESSURE: 99 MMHG | SYSTOLIC BLOOD PRESSURE: 173 MMHG

## 2023-04-27 DIAGNOSIS — K00.2: Primary | ICD-10-CM
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
## 2023-08-04 NOTE — CASE MANAGEMENT
Cm was informed that pt will need transport for tomorrow  Cm spoke with pt via TC and pt states that she spoke with her son and her son will be able to transport her tomorrow  Cm informed RN to have pt call her son when ready  [phone]

## (undated) DEVICE — UNTHREADED GUIDE WIRE: Brand: FIXOS

## (undated) DEVICE — DRAPE C-ARM X-RAY

## (undated) DEVICE — CAST PADDING 4 IN SYNTHETIC NON-STRL

## (undated) DEVICE — STOCKINETTE REGULAR

## (undated) DEVICE — TUBING SUCTION 5MM X 12 FT

## (undated) DEVICE — CUFF TOURNIQUET 30 X 4 IN QUICK CONNECT DISP 1BLA

## (undated) DEVICE — 2000CC GUARDIAN II: Brand: GUARDIAN

## (undated) DEVICE — STRETCH BANDAGE: Brand: CURITY

## (undated) DEVICE — 3M™ DURAPORE™ SURGICAL TAPE 1538-3, 3 INCH X 10 YARD (7,5CM X 9,1M), 4 ROLLS/BOX: Brand: 3M™ DURAPORE™

## (undated) DEVICE — BLADE SAGITTAL 25.6 X 9.5MM

## (undated) DEVICE — 3M™ TEGADERM™ TRANSPARENT FILM DRESSING FRAME STYLE, 1626W, 4 IN X 4-3/4 IN (10 CM X 12 CM), 50/CT 4CT/CASE: Brand: 3M™ TEGADERM™

## (undated) DEVICE — COBAN 6 IN STERILE

## (undated) DEVICE — SUT VICRYL 3-0 PS-2 27 IN J427H

## (undated) DEVICE — SUT ETHILON 3-0 PS-1 18 IN 1663G

## (undated) DEVICE — REAMER

## (undated) DEVICE — SPONGE LAP 18 X 18 IN STRL RFD

## (undated) DEVICE — SYRINGE 10ML LL

## (undated) DEVICE — NEEDLE 25G X 1 1/2

## (undated) DEVICE — GLOVE INDICATOR PI UNDERGLOVE SZ 8 BLUE

## (undated) DEVICE — PLUMEPEN PRO 10FT

## (undated) DEVICE — INTENDED FOR TISSUE SEPARATION, AND OTHER PROCEDURES THAT REQUIRE A SHARP SURGICAL BLADE TO PUNCTURE OR CUT.: Brand: BARD-PARKER ® CARBON RIB-BACK BLADES

## (undated) DEVICE — GLOVE SRG BIOGEL 8.5

## (undated) DEVICE — PADDING CAST 4 IN  COTTON STRL

## (undated) DEVICE — NEEDLE 18 G X 1 1/2

## (undated) DEVICE — MEDI-VAC YANKAUER SUCTION HANDLE W/BULBOUS AND CONTROL VENT: Brand: CARDINAL HEALTH

## (undated) DEVICE — KIRSCHNER WIRE
Type: IMPLANTABLE DEVICE | Site: ANKLE | Status: NON-FUNCTIONAL
Removed: 2020-10-15

## (undated) DEVICE — KERLIX BANDAGE ROLL: Brand: KERLIX

## (undated) DEVICE — GLOVE SRG BIOGEL 7.5

## (undated) DEVICE — SUT VICRYL 2-0 REEL 54 IN J286G

## (undated) DEVICE — BETHLEHEM UNIVERSAL  MIONR EXT: Brand: CARDINAL HEALTH

## (undated) DEVICE — DRESSING XEROFORM 5 X 9

## (undated) DEVICE — PREP PAD BNS: Brand: CONVERTORS

## (undated) DEVICE — GLOVE SRG BIOGEL 6.5

## (undated) DEVICE — OPENING DRILL

## (undated) DEVICE — 10FR FRAZIER SUCTION HANDLE: Brand: CARDINAL HEALTH

## (undated) DEVICE — CHLORAPREP HI-LITE 26ML ORANGE

## (undated) DEVICE — ACE WRAP 4 IN STERILE

## (undated) DEVICE — OCCLUSIVE GAUZE STRIP,3% BISMUTH TRIBROMOPHENATE IN PETROLATUM BLEND: Brand: XEROFORM

## (undated) DEVICE — SUT VICRYL 4-0 PS-2 27 IN J426H

## (undated) DEVICE — SCD SEQUENTIAL COMPRESSION COMFORT SLEEVE MEDIUM KNEE LENGTH: Brand: KENDALL SCD

## (undated) DEVICE — WEBRIL 6 IN UNSTERILE

## (undated) DEVICE — ACE WRAP 3 IN STERILE

## (undated) DEVICE — SUT ETHILON 4-0 PS-2 18 IN 1667H

## (undated) DEVICE — BONE STIMULATOR NON INVASIVE

## (undated) DEVICE — DRAPE C-ARMOUR

## (undated) DEVICE — DRESSING BIOPATCH ANTIMICROBIAL 1 IN DISC

## (undated) DEVICE — SCREWDRIVER BLADE, AO, T10

## (undated) DEVICE — EGR KIT: Brand: ENDOTRAC

## (undated) DEVICE — BONE PLUG EXTRACTOR

## (undated) DEVICE — CAST PADDING 3 IN SYNTHETIC NON-STRL

## (undated) DEVICE — GLOVE INDICATOR PI UNDERGLOVE SZ 8.5 BLUE

## (undated) DEVICE — ACE WRAP 6 IN UNSTERILE

## (undated) DEVICE — SUT VICRYL 2-0 SH 27 IN UNDYED J417H

## (undated) DEVICE — GLOVE INDICATOR PI UNDERGLOVE SZ 7 BLUE

## (undated) DEVICE — ACE WRAP 3 IN UNSTERILE

## (undated) DEVICE — GAUZE SPONGES,16 PLY: Brand: CURITY

## (undated) DEVICE — ACE WRAP 4 IN UNSTERILE

## (undated) DEVICE — GLOVE INDICATOR PI UNDERGLOVE SZ 6.5 BLUE

## (undated) DEVICE — ASTOUND STANDARD SURGICAL GOWN, XL: Brand: CONVERTORS

## (undated) DEVICE — GLOVE SRG BIOGEL 7

## (undated) DEVICE — CAST PADDING 6 IN SYNTHETIC STRL

## (undated) DEVICE — CANNULATED SCREWDRIVER: Brand: FIXOS

## (undated) DEVICE — SCALED DRILL BIT, AO FITTING

## (undated) DEVICE — THE SIMPULSE SOLO SYSTEM WITH ULTREX RETRACTABLE SPLASH SHIELD TIP: Brand: SIMPULSE SOLO

## (undated) DEVICE — SYRINGE 3ML LL

## (undated) DEVICE — 4.0 MM X 2.35 MM X 70.0 MM OVAL CARBIDE BUR, 8 FLUTE

## (undated) DEVICE — INTENDED FOR TISSUE SEPARATION, AND OTHER PROCEDURES THAT REQUIRE A SHARP SURGICAL BLADE TO PUNCTURE OR CUT.: Brand: BARD-PARKER SAFETY BLADES SIZE 15, STERILE

## (undated) DEVICE — SUT VICRYL 3-0 SH 27 IN J416H